# Patient Record
Sex: FEMALE | Race: BLACK OR AFRICAN AMERICAN | NOT HISPANIC OR LATINO
[De-identification: names, ages, dates, MRNs, and addresses within clinical notes are randomized per-mention and may not be internally consistent; named-entity substitution may affect disease eponyms.]

---

## 2018-12-10 ENCOUNTER — APPOINTMENT (OUTPATIENT)
Dept: MRI IMAGING | Facility: HOSPITAL | Age: 83
End: 2018-12-10

## 2018-12-10 ENCOUNTER — OUTPATIENT (OUTPATIENT)
Dept: OUTPATIENT SERVICES | Facility: HOSPITAL | Age: 83
LOS: 1 days | End: 2018-12-10
Payer: COMMERCIAL

## 2018-12-10 DIAGNOSIS — I50.32 CHRONIC DIASTOLIC (CONGESTIVE) HEART FAILURE: ICD-10-CM

## 2018-12-10 DIAGNOSIS — I51.7 CARDIOMEGALY: ICD-10-CM

## 2018-12-10 PROCEDURE — 75561 CARDIAC MRI FOR MORPH W/DYE: CPT

## 2018-12-10 PROCEDURE — A9585: CPT

## 2018-12-10 PROCEDURE — 75561 CARDIAC MRI FOR MORPH W/DYE: CPT | Mod: 26

## 2018-12-14 ENCOUNTER — OUTPATIENT (OUTPATIENT)
Dept: OUTPATIENT SERVICES | Facility: HOSPITAL | Age: 83
LOS: 1 days | End: 2018-12-14

## 2018-12-14 DIAGNOSIS — I50.32 CHRONIC DIASTOLIC (CONGESTIVE) HEART FAILURE: ICD-10-CM

## 2018-12-14 PROCEDURE — 75561 CARDIAC MRI FOR MORPH W/DYE: CPT

## 2018-12-14 PROCEDURE — 75561 CARDIAC MRI FOR MORPH W/DYE: CPT | Mod: 26

## 2019-01-01 ENCOUNTER — OUTPATIENT (OUTPATIENT)
Dept: OUTPATIENT SERVICES | Facility: HOSPITAL | Age: 84
LOS: 1 days | End: 2019-01-01
Payer: MEDICARE

## 2019-01-01 PROCEDURE — G9001: CPT

## 2019-01-02 RX ORDER — AZTREONAM 2 G
1 VIAL (EA) INJECTION
Qty: 0 | Refills: 0 | COMMUNITY
Start: 2019-01-02

## 2019-01-05 ENCOUNTER — INPATIENT (INPATIENT)
Facility: HOSPITAL | Age: 84
LOS: 8 days | Discharge: HOSPICE | End: 2019-01-14
Attending: HOSPITALIST | Admitting: HOSPITALIST
Payer: MEDICARE

## 2019-01-05 VITALS
DIASTOLIC BLOOD PRESSURE: 76 MMHG | SYSTOLIC BLOOD PRESSURE: 118 MMHG | OXYGEN SATURATION: 100 % | TEMPERATURE: 98 F | HEART RATE: 103 BPM | RESPIRATION RATE: 15 BRPM

## 2019-01-05 DIAGNOSIS — Z29.9 ENCOUNTER FOR PROPHYLACTIC MEASURES, UNSPECIFIED: ICD-10-CM

## 2019-01-05 DIAGNOSIS — N17.9 ACUTE KIDNEY FAILURE, UNSPECIFIED: ICD-10-CM

## 2019-01-05 DIAGNOSIS — I63.9 CEREBRAL INFARCTION, UNSPECIFIED: ICD-10-CM

## 2019-01-05 DIAGNOSIS — R41.82 ALTERED MENTAL STATUS, UNSPECIFIED: ICD-10-CM

## 2019-01-05 DIAGNOSIS — I50.9 HEART FAILURE, UNSPECIFIED: ICD-10-CM

## 2019-01-05 DIAGNOSIS — G93.40 ENCEPHALOPATHY, UNSPECIFIED: ICD-10-CM

## 2019-01-05 DIAGNOSIS — R74.8 ABNORMAL LEVELS OF OTHER SERUM ENZYMES: ICD-10-CM

## 2019-01-05 LAB
ALBUMIN SERPL ELPH-MCNC: 3.3 G/DL — SIGNIFICANT CHANGE UP (ref 3.3–5)
ALBUMIN SERPL ELPH-MCNC: 3.3 G/DL — SIGNIFICANT CHANGE UP (ref 3.3–5)
ALBUMIN SERPL ELPH-MCNC: 3.6 G/DL — SIGNIFICANT CHANGE UP (ref 3.3–5)
ALP SERPL-CCNC: 476 U/L — HIGH (ref 40–120)
ALP SERPL-CCNC: 486 U/L — HIGH (ref 40–120)
ALP SERPL-CCNC: 489 U/L — HIGH (ref 40–120)
ALT FLD-CCNC: 157 U/L — HIGH (ref 4–33)
ALT FLD-CCNC: 161 U/L — HIGH (ref 4–33)
ALT FLD-CCNC: 178 U/L — HIGH (ref 4–33)
APPEARANCE UR: CLEAR — SIGNIFICANT CHANGE UP
APPEARANCE UR: CLEAR — SIGNIFICANT CHANGE UP
AST SERPL-CCNC: 131 U/L — HIGH (ref 4–32)
AST SERPL-CCNC: 147 U/L — HIGH (ref 4–32)
AST SERPL-CCNC: 201 U/L — HIGH (ref 4–32)
B PERT DNA SPEC QL NAA+PROBE: NOT DETECTED — SIGNIFICANT CHANGE UP
BACTERIA # UR AUTO: NEGATIVE — SIGNIFICANT CHANGE UP
BACTERIA # UR AUTO: NEGATIVE — SIGNIFICANT CHANGE UP
BASE EXCESS BLDA CALC-SCNC: -3.4 MMOL/L — SIGNIFICANT CHANGE UP
BASE EXCESS BLDV CALC-SCNC: 3.9 MMOL/L — SIGNIFICANT CHANGE UP
BASOPHILS # BLD AUTO: 0.08 K/UL — SIGNIFICANT CHANGE UP (ref 0–0.2)
BASOPHILS # BLD AUTO: 0.1 K/UL — SIGNIFICANT CHANGE UP (ref 0–0.2)
BASOPHILS NFR BLD AUTO: 1.2 % — SIGNIFICANT CHANGE UP (ref 0–2)
BASOPHILS NFR BLD AUTO: 1.3 % — SIGNIFICANT CHANGE UP (ref 0–2)
BILIRUB SERPL-MCNC: 0.4 MG/DL — SIGNIFICANT CHANGE UP (ref 0.2–1.2)
BILIRUB SERPL-MCNC: 0.5 MG/DL — SIGNIFICANT CHANGE UP (ref 0.2–1.2)
BILIRUB SERPL-MCNC: 0.7 MG/DL — SIGNIFICANT CHANGE UP (ref 0.2–1.2)
BILIRUB UR-MCNC: NEGATIVE — SIGNIFICANT CHANGE UP
BILIRUB UR-MCNC: NEGATIVE — SIGNIFICANT CHANGE UP
BLOOD GAS VENOUS - CREATININE: 1.5 MG/DL — HIGH (ref 0.5–1.3)
BLOOD UR QL VISUAL: SIGNIFICANT CHANGE UP
BLOOD UR QL VISUAL: SIGNIFICANT CHANGE UP
BUN SERPL-MCNC: 15 MG/DL — SIGNIFICANT CHANGE UP (ref 7–23)
BUN SERPL-MCNC: 15 MG/DL — SIGNIFICANT CHANGE UP (ref 7–23)
BUN SERPL-MCNC: 16 MG/DL — SIGNIFICANT CHANGE UP (ref 7–23)
C PNEUM DNA SPEC QL NAA+PROBE: NOT DETECTED — SIGNIFICANT CHANGE UP
CA-I BLDA-SCNC: 1.16 MMOL/L — SIGNIFICANT CHANGE UP (ref 1.15–1.29)
CALCIUM SERPL-MCNC: 9 MG/DL — SIGNIFICANT CHANGE UP (ref 8.4–10.5)
CALCIUM SERPL-MCNC: 9.2 MG/DL — SIGNIFICANT CHANGE UP (ref 8.4–10.5)
CALCIUM SERPL-MCNC: 9.3 MG/DL — SIGNIFICANT CHANGE UP (ref 8.4–10.5)
CHLORIDE BLDV-SCNC: 103 MMOL/L — SIGNIFICANT CHANGE UP (ref 96–108)
CHLORIDE SERPL-SCNC: 100 MMOL/L — SIGNIFICANT CHANGE UP (ref 98–107)
CHLORIDE SERPL-SCNC: 96 MMOL/L — LOW (ref 98–107)
CHLORIDE SERPL-SCNC: 98 MMOL/L — SIGNIFICANT CHANGE UP (ref 98–107)
CO2 SERPL-SCNC: 15 MMOL/L — LOW (ref 22–31)
CO2 SERPL-SCNC: 25 MMOL/L — SIGNIFICANT CHANGE UP (ref 22–31)
CO2 SERPL-SCNC: 26 MMOL/L — SIGNIFICANT CHANGE UP (ref 22–31)
COLOR SPEC: COLORLESS — SIGNIFICANT CHANGE UP
COLOR SPEC: SIGNIFICANT CHANGE UP
CREAT SERPL-MCNC: 1.46 MG/DL — HIGH (ref 0.5–1.3)
CREAT SERPL-MCNC: 1.46 MG/DL — HIGH (ref 0.5–1.3)
CREAT SERPL-MCNC: 1.53 MG/DL — HIGH (ref 0.5–1.3)
EOSINOPHIL # BLD AUTO: 0.03 K/UL — SIGNIFICANT CHANGE UP (ref 0–0.5)
EOSINOPHIL # BLD AUTO: 0.25 K/UL — SIGNIFICANT CHANGE UP (ref 0–0.5)
EOSINOPHIL NFR BLD AUTO: 0.4 % — SIGNIFICANT CHANGE UP (ref 0–6)
EOSINOPHIL NFR BLD AUTO: 3.8 % — SIGNIFICANT CHANGE UP (ref 0–6)
FLUAV H1 2009 PAND RNA SPEC QL NAA+PROBE: NOT DETECTED — SIGNIFICANT CHANGE UP
FLUAV H1 RNA SPEC QL NAA+PROBE: NOT DETECTED — SIGNIFICANT CHANGE UP
FLUAV H3 RNA SPEC QL NAA+PROBE: NOT DETECTED — SIGNIFICANT CHANGE UP
FLUAV SUBTYP SPEC NAA+PROBE: NOT DETECTED — SIGNIFICANT CHANGE UP
FLUBV RNA SPEC QL NAA+PROBE: NOT DETECTED — SIGNIFICANT CHANGE UP
GAS PNL BLDV: 133 MMOL/L — LOW (ref 136–146)
GLUCOSE BLDA-MCNC: 154 MG/DL — HIGH (ref 70–99)
GLUCOSE BLDV-MCNC: 97 — SIGNIFICANT CHANGE UP (ref 70–99)
GLUCOSE SERPL-MCNC: 129 MG/DL — HIGH (ref 70–99)
GLUCOSE SERPL-MCNC: 153 MG/DL — HIGH (ref 70–99)
GLUCOSE SERPL-MCNC: 96 MG/DL — SIGNIFICANT CHANGE UP (ref 70–99)
GLUCOSE UR-MCNC: NEGATIVE — SIGNIFICANT CHANGE UP
GLUCOSE UR-MCNC: NEGATIVE — SIGNIFICANT CHANGE UP
HADV DNA SPEC QL NAA+PROBE: NOT DETECTED — SIGNIFICANT CHANGE UP
HCO3 BLDA-SCNC: 21 MMOL/L — LOW (ref 22–26)
HCO3 BLDV-SCNC: 27 MMOL/L — SIGNIFICANT CHANGE UP (ref 20–27)
HCOV PNL SPEC NAA+PROBE: SIGNIFICANT CHANGE UP
HCT VFR BLD CALC: 31 % — LOW (ref 34.5–45)
HCT VFR BLD CALC: 36.9 % — SIGNIFICANT CHANGE UP (ref 34.5–45)
HCT VFR BLDA CALC: 30.1 % — LOW (ref 34.5–46.5)
HCT VFR BLDV CALC: 32 % — LOW (ref 34.5–45)
HGB BLD-MCNC: 10 G/DL — LOW (ref 11.5–15.5)
HGB BLD-MCNC: 11.3 G/DL — LOW (ref 11.5–15.5)
HGB BLDA-MCNC: 9.7 G/DL — LOW (ref 11.5–15.5)
HGB BLDV-MCNC: 10.4 G/DL — LOW (ref 11.5–15.5)
HMPV RNA SPEC QL NAA+PROBE: NOT DETECTED — SIGNIFICANT CHANGE UP
HPIV1 RNA SPEC QL NAA+PROBE: NOT DETECTED — SIGNIFICANT CHANGE UP
HPIV2 RNA SPEC QL NAA+PROBE: NOT DETECTED — SIGNIFICANT CHANGE UP
HPIV3 RNA SPEC QL NAA+PROBE: NOT DETECTED — SIGNIFICANT CHANGE UP
HPIV4 RNA SPEC QL NAA+PROBE: NOT DETECTED — SIGNIFICANT CHANGE UP
HYALINE CASTS # UR AUTO: SIGNIFICANT CHANGE UP
IMM GRANULOCYTES NFR BLD AUTO: 0.6 % — SIGNIFICANT CHANGE UP (ref 0–1.5)
IMM GRANULOCYTES NFR BLD AUTO: 0.8 % — SIGNIFICANT CHANGE UP (ref 0–1.5)
KETONES UR-MCNC: NEGATIVE — SIGNIFICANT CHANGE UP
KETONES UR-MCNC: NEGATIVE — SIGNIFICANT CHANGE UP
LACTATE BLDA-SCNC: 4.2 MMOL/L — CRITICAL HIGH (ref 0.5–2)
LACTATE BLDV-MCNC: 1.4 MMOL/L — SIGNIFICANT CHANGE UP (ref 0.5–2)
LEUKOCYTE ESTERASE UR-ACNC: SIGNIFICANT CHANGE UP
LEUKOCYTE ESTERASE UR-ACNC: SIGNIFICANT CHANGE UP
LYMPHOCYTES # BLD AUTO: 0.85 K/UL — LOW (ref 1–3.3)
LYMPHOCYTES # BLD AUTO: 1.34 K/UL — SIGNIFICANT CHANGE UP (ref 1–3.3)
LYMPHOCYTES # BLD AUTO: 11.1 % — LOW (ref 13–44)
LYMPHOCYTES # BLD AUTO: 20.4 % — SIGNIFICANT CHANGE UP (ref 13–44)
MAGNESIUM SERPL-MCNC: 2.2 MG/DL — SIGNIFICANT CHANGE UP (ref 1.6–2.6)
MCHC RBC-ENTMCNC: 25.6 PG — LOW (ref 27–34)
MCHC RBC-ENTMCNC: 26 PG — LOW (ref 27–34)
MCHC RBC-ENTMCNC: 30.6 % — LOW (ref 32–36)
MCHC RBC-ENTMCNC: 32.3 % — SIGNIFICANT CHANGE UP (ref 32–36)
MCV RBC AUTO: 79.5 FL — LOW (ref 80–100)
MCV RBC AUTO: 84.8 FL — SIGNIFICANT CHANGE UP (ref 80–100)
MONOCYTES # BLD AUTO: 0.52 K/UL — SIGNIFICANT CHANGE UP (ref 0–0.9)
MONOCYTES # BLD AUTO: 0.53 K/UL — SIGNIFICANT CHANGE UP (ref 0–0.9)
MONOCYTES NFR BLD AUTO: 6.8 % — SIGNIFICANT CHANGE UP (ref 2–14)
MONOCYTES NFR BLD AUTO: 8.1 % — SIGNIFICANT CHANGE UP (ref 2–14)
NEUTROPHILS # BLD AUTO: 4.32 K/UL — SIGNIFICANT CHANGE UP (ref 1.8–7.4)
NEUTROPHILS # BLD AUTO: 6.08 K/UL — SIGNIFICANT CHANGE UP (ref 1.8–7.4)
NEUTROPHILS NFR BLD AUTO: 65.9 % — SIGNIFICANT CHANGE UP (ref 43–77)
NEUTROPHILS NFR BLD AUTO: 79.6 % — HIGH (ref 43–77)
NITRITE UR-MCNC: NEGATIVE — SIGNIFICANT CHANGE UP
NITRITE UR-MCNC: NEGATIVE — SIGNIFICANT CHANGE UP
NRBC # FLD: 0 K/UL — LOW (ref 25–125)
NRBC # FLD: 0 K/UL — LOW (ref 25–125)
PCO2 BLDA: 44 MMHG — SIGNIFICANT CHANGE UP (ref 32–48)
PCO2 BLDV: 46 MMHG — SIGNIFICANT CHANGE UP (ref 41–51)
PH BLDA: 7.32 PH — LOW (ref 7.35–7.45)
PH BLDV: 7.41 PH — SIGNIFICANT CHANGE UP (ref 7.32–7.43)
PH UR: 6 — SIGNIFICANT CHANGE UP (ref 5–8)
PH UR: 6.5 — SIGNIFICANT CHANGE UP (ref 5–8)
PHOSPHATE SERPL-MCNC: 5.1 MG/DL — HIGH (ref 2.5–4.5)
PLATELET # BLD AUTO: 367 K/UL — SIGNIFICANT CHANGE UP (ref 150–400)
PLATELET # BLD AUTO: 376 K/UL — SIGNIFICANT CHANGE UP (ref 150–400)
PMV BLD: 10.9 FL — SIGNIFICANT CHANGE UP (ref 7–13)
PMV BLD: 11 FL — SIGNIFICANT CHANGE UP (ref 7–13)
PO2 BLDA: 137 MMHG — HIGH (ref 83–108)
PO2 BLDV: 46 MMHG — HIGH (ref 35–40)
POTASSIUM BLDA-SCNC: 4.7 MMOL/L — HIGH (ref 3.4–4.5)
POTASSIUM BLDV-SCNC: SIGNIFICANT CHANGE UP MMOL/L (ref 3.4–4.5)
POTASSIUM SERPL-MCNC: 5.2 MMOL/L — SIGNIFICANT CHANGE UP (ref 3.5–5.3)
POTASSIUM SERPL-MCNC: 5.5 MMOL/L — HIGH (ref 3.5–5.3)
POTASSIUM SERPL-MCNC: 6 MMOL/L — HIGH (ref 3.5–5.3)
POTASSIUM SERPL-SCNC: 5.2 MMOL/L — SIGNIFICANT CHANGE UP (ref 3.5–5.3)
POTASSIUM SERPL-SCNC: 5.5 MMOL/L — HIGH (ref 3.5–5.3)
POTASSIUM SERPL-SCNC: 6 MMOL/L — HIGH (ref 3.5–5.3)
PROT SERPL-MCNC: 7.1 G/DL — SIGNIFICANT CHANGE UP (ref 6–8.3)
PROT SERPL-MCNC: 7.7 G/DL — SIGNIFICANT CHANGE UP (ref 6–8.3)
PROT SERPL-MCNC: 7.8 G/DL — SIGNIFICANT CHANGE UP (ref 6–8.3)
PROT UR-MCNC: 30 — SIGNIFICANT CHANGE UP
PROT UR-MCNC: NEGATIVE — SIGNIFICANT CHANGE UP
RBC # BLD: 3.9 M/UL — SIGNIFICANT CHANGE UP (ref 3.8–5.2)
RBC # BLD: 4.35 M/UL — SIGNIFICANT CHANGE UP (ref 3.8–5.2)
RBC # FLD: 15.3 % — HIGH (ref 10.3–14.5)
RBC # FLD: 15.6 % — HIGH (ref 10.3–14.5)
RBC CASTS # UR COMP ASSIST: HIGH (ref 0–?)
RBC CASTS # UR COMP ASSIST: HIGH (ref 0–?)
RSV RNA SPEC QL NAA+PROBE: NOT DETECTED — SIGNIFICANT CHANGE UP
RV+EV RNA SPEC QL NAA+PROBE: NOT DETECTED — SIGNIFICANT CHANGE UP
SAO2 % BLDA: 98.6 % — SIGNIFICANT CHANGE UP (ref 95–99)
SAO2 % BLDV: 78.7 % — SIGNIFICANT CHANGE UP (ref 60–85)
SODIUM BLDA-SCNC: 132 MMOL/L — LOW (ref 136–146)
SODIUM SERPL-SCNC: 133 MMOL/L — LOW (ref 135–145)
SODIUM SERPL-SCNC: 135 MMOL/L — SIGNIFICANT CHANGE UP (ref 135–145)
SODIUM SERPL-SCNC: 138 MMOL/L — SIGNIFICANT CHANGE UP (ref 135–145)
SP GR SPEC: 1.02 — SIGNIFICANT CHANGE UP (ref 1–1.04)
SP GR SPEC: 1.02 — SIGNIFICANT CHANGE UP (ref 1–1.04)
SQUAMOUS # UR AUTO: SIGNIFICANT CHANGE UP
SQUAMOUS # UR AUTO: SIGNIFICANT CHANGE UP
TROPONIN T, HIGH SENSITIVITY: 58 NG/L — CRITICAL HIGH (ref ?–14)
TROPONIN T, HIGH SENSITIVITY: 81 NG/L — CRITICAL HIGH (ref ?–14)
UROBILINOGEN FLD QL: NORMAL — SIGNIFICANT CHANGE UP
UROBILINOGEN FLD QL: NORMAL — SIGNIFICANT CHANGE UP
WBC # BLD: 6.56 K/UL — SIGNIFICANT CHANGE UP (ref 3.8–10.5)
WBC # BLD: 7.64 K/UL — SIGNIFICANT CHANGE UP (ref 3.8–10.5)
WBC # FLD AUTO: 6.56 K/UL — SIGNIFICANT CHANGE UP (ref 3.8–10.5)
WBC # FLD AUTO: 7.64 K/UL — SIGNIFICANT CHANGE UP (ref 3.8–10.5)
WBC UR QL: HIGH (ref 0–?)
WBC UR QL: SIGNIFICANT CHANGE UP (ref 0–?)

## 2019-01-05 PROCEDURE — 70498 CT ANGIOGRAPHY NECK: CPT | Mod: 26

## 2019-01-05 PROCEDURE — 99223 1ST HOSP IP/OBS HIGH 75: CPT | Mod: GC

## 2019-01-05 PROCEDURE — 76705 ECHO EXAM OF ABDOMEN: CPT | Mod: 26

## 2019-01-05 PROCEDURE — 71045 X-RAY EXAM CHEST 1 VIEW: CPT | Mod: 26

## 2019-01-05 PROCEDURE — 70496 CT ANGIOGRAPHY HEAD: CPT | Mod: 26

## 2019-01-05 PROCEDURE — 93010 ELECTROCARDIOGRAM REPORT: CPT

## 2019-01-05 PROCEDURE — 99497 ADVNCD CARE PLAN 30 MIN: CPT | Mod: 25

## 2019-01-05 RX ORDER — BRIMONIDINE TARTRATE 2 MG/MG
1 SOLUTION/ DROPS OPHTHALMIC DAILY
Qty: 0 | Refills: 0 | Status: DISCONTINUED | OUTPATIENT
Start: 2019-01-05 | End: 2019-01-14

## 2019-01-05 RX ORDER — ACETAMINOPHEN 500 MG
650 TABLET ORAL EVERY 6 HOURS
Qty: 0 | Refills: 0 | Status: DISCONTINUED | OUTPATIENT
Start: 2019-01-05 | End: 2019-01-05

## 2019-01-05 RX ORDER — FUROSEMIDE 40 MG
40 TABLET ORAL ONCE
Qty: 0 | Refills: 0 | Status: COMPLETED | OUTPATIENT
Start: 2019-01-05 | End: 2019-01-05

## 2019-01-05 RX ORDER — MECLIZINE HCL 12.5 MG
25 TABLET ORAL ONCE
Qty: 0 | Refills: 0 | Status: DISCONTINUED | OUTPATIENT
Start: 2019-01-05 | End: 2019-01-05

## 2019-01-05 RX ORDER — CEFTRIAXONE 500 MG/1
1 INJECTION, POWDER, FOR SOLUTION INTRAMUSCULAR; INTRAVENOUS ONCE
Qty: 0 | Refills: 0 | Status: COMPLETED | OUTPATIENT
Start: 2019-01-05 | End: 2019-01-05

## 2019-01-05 RX ORDER — SODIUM CHLORIDE 9 MG/ML
1000 INJECTION INTRAMUSCULAR; INTRAVENOUS; SUBCUTANEOUS ONCE
Qty: 0 | Refills: 0 | Status: COMPLETED | OUTPATIENT
Start: 2019-01-05 | End: 2019-01-05

## 2019-01-05 RX ORDER — CEFEPIME 1 G/1
1000 INJECTION, POWDER, FOR SOLUTION INTRAMUSCULAR; INTRAVENOUS EVERY 24 HOURS
Qty: 0 | Refills: 0 | Status: DISCONTINUED | OUTPATIENT
Start: 2019-01-06 | End: 2019-01-06

## 2019-01-05 RX ORDER — VANCOMYCIN HCL 1 G
1000 VIAL (EA) INTRAVENOUS EVERY 12 HOURS
Qty: 0 | Refills: 0 | Status: DISCONTINUED | OUTPATIENT
Start: 2019-01-06 | End: 2019-01-06

## 2019-01-05 RX ORDER — AZITHROMYCIN 500 MG/1
500 TABLET, FILM COATED ORAL ONCE
Qty: 0 | Refills: 0 | Status: COMPLETED | OUTPATIENT
Start: 2019-01-05 | End: 2019-01-05

## 2019-01-05 RX ORDER — ACETAMINOPHEN 500 MG
650 TABLET ORAL EVERY 6 HOURS
Qty: 0 | Refills: 0 | Status: DISCONTINUED | OUTPATIENT
Start: 2019-01-05 | End: 2019-01-06

## 2019-01-05 RX ORDER — ACETAMINOPHEN 500 MG
1000 TABLET ORAL ONCE
Qty: 0 | Refills: 0 | Status: COMPLETED | OUTPATIENT
Start: 2019-01-05 | End: 2019-01-05

## 2019-01-05 RX ORDER — DORZOLAMIDE HYDROCHLORIDE 20 MG/ML
1 SOLUTION/ DROPS OPHTHALMIC THREE TIMES A DAY
Qty: 0 | Refills: 0 | Status: DISCONTINUED | OUTPATIENT
Start: 2019-01-05 | End: 2019-01-14

## 2019-01-05 RX ORDER — TIMOLOL 0.5 %
1 DROPS OPHTHALMIC (EYE) DAILY
Qty: 0 | Refills: 0 | Status: DISCONTINUED | OUTPATIENT
Start: 2019-01-05 | End: 2019-01-14

## 2019-01-05 RX ADMIN — CEFTRIAXONE 1 GRAM(S): 500 INJECTION, POWDER, FOR SOLUTION INTRAMUSCULAR; INTRAVENOUS at 08:18

## 2019-01-05 RX ADMIN — AZITHROMYCIN 250 MILLIGRAM(S): 500 TABLET, FILM COATED ORAL at 09:50

## 2019-01-05 RX ADMIN — SODIUM CHLORIDE 500 MILLILITER(S): 9 INJECTION INTRAMUSCULAR; INTRAVENOUS; SUBCUTANEOUS at 09:47

## 2019-01-05 RX ADMIN — Medication 650 MILLIGRAM(S): at 15:28

## 2019-01-05 RX ADMIN — Medication 1000 MILLIGRAM(S): at 08:18

## 2019-01-05 RX ADMIN — CEFTRIAXONE 100 GRAM(S): 500 INJECTION, POWDER, FOR SOLUTION INTRAMUSCULAR; INTRAVENOUS at 06:38

## 2019-01-05 RX ADMIN — Medication 40 MILLIGRAM(S): at 16:30

## 2019-01-05 RX ADMIN — Medication 400 MILLIGRAM(S): at 06:43

## 2019-01-05 NOTE — H&P ADULT - NSHPPHYSICALEXAM_GEN_ALL_CORE
PHYSICAL EXAM:  GENERAL: Mild ranging to moderate distress, thin, lying on her right side  HEAD:  Atraumatic, Normocephalic  EYES: EOMI, conjunctiva and sclera clear  NECK: Supple, No obvious JVD  CHEST/LUNG: Soft crackles in dependent portion of R lung posteriorly ; No wheeze  HEART: Fast rate and rhythm; No murmurs, rubs, or gallops  ABDOMEN: Soft, Nontender, Nondistended; Bowel sounds present  EXTREMITIES:  2+ Peripheral Pulses, No clubbing, cyanosis, or LE edema  PSYCH: AAOx0-1, unable to state own name, says I can't say, but does respond to her name  NEUROLOGY: non-focal, gross UE strength R< L  SKIN: No rashes or lesions

## 2019-01-05 NOTE — ED ADULT NURSE NOTE - CHIEF COMPLAINT QUOTE
from [facility] weak lethargic  as per daughters has altered mental status  last normal unknown   recent UTI  CVA with right weakness  2 weeks ago   hx CHF  breast Ca

## 2019-01-05 NOTE — ED PROVIDER NOTE - CARE PLAN
Principal Discharge DX:	Altered mental status  Goal:	Most likely metabolic vs ischemic stroke  Assessment and plan of treatment:	Most likely metabolic vs ischemic stroke, CTH showed subacute stroke and UA/CXR/ALP/LFT elevated possible sources of AMS

## 2019-01-05 NOTE — CONSULT NOTE ADULT - SUBJECTIVE AND OBJECTIVE BOX
HPI:  History obtained from family and chart, patient is uttering few words only, cannot be interviewed  Patient is an 85 year old RH AA female with PMHx of stroke 2 weeks PTA with right sided deficits (unable to further specify, but is now ambulating with walker but was not using one prior to stroke), Breast Ca, CHF who was at [facility] for PT/rehab. Since the stroke the patient has been unable to do her ADLs, and has had worsened mentation and memory. The patient has been having worsening confusion and increased fatigue ability. She has no known recent infections, but had ?abdominal pain earlier this week.  Outside hospital records are not available but family states that they will bring them in.  NIHSS (at least) 15, preMRS 3    PAST MEDICAL & SURGICAL HISTORY:  CHF (congestive heart failure)  Breast cancer  CVA (cerebral vascular accident)  No significant past surgical history    Allergies  No Known Allergies    Review of Systems:  CONSTITUTIONAL:  No weight loss, fever, chills, weakness or fatigue.  HEENT:  Eyes:  No visual loss, blurred vision, double vision or yellow sclerae. Ears, Nose, Throat:  No hearing loss, sneezing, congestion, runny nose or sore throat.  CARDIOVASCULAR:  No chest pain, chest pressure or chest discomfort. No palpitations or edema.  GASTROINTESTINAL:  No anorexia, nausea, vomiting or diarrhea. No abdominal pain or blood.  NEUROLOGICAL: See HPI  MUSCULOSKELETAL:  No muscle, back pain, joint pain or stiffness.  PSYCHIATRIC:  No history of depression or anxiety.    Vital Signs Last 24 Hrs  T(C): 36.6 (05 Jan 2019 03:46), Max: 36.6 (05 Jan 2019 01:14)  T(F): 97.8 (05 Jan 2019 03:46), Max: 97.8 (05 Jan 2019 01:14)  HR: 103 (05 Jan 2019 09:54) (103 - 103)  BP: 129/70 (05 Jan 2019 09:54) (104/53 - 129/70)  BP(mean): --  RR: 16 (05 Jan 2019 09:54) (15 - 17)  SpO2: 98% (05 Jan 2019 09:54) (98% - 100%)    General Exam:   General appearance: No acute distress                 Neurological Exam:  Mental Status: AOx0  severely aphasic     Cranial Nerves: CN I - not tested.  s/p cataract surgery in R eye, lt eye reactive  No facial asymmetry.     Motor:   Tone: normal.                  Strength:     not participating/cooperating with exam. able to squeeze fingers ( 4/5 bilaterally), able to raise LLE (approx 4/5 HF), cannot lift RLE    Pronator drift: cannot assess                 Dysmetria: cannot assess     Tremor: No resting, postural or action tremor.  No myoclonus.    Sensation: intact to pain (pinching)    Toes flexor bilaterally  Gait: deferred    Other:  Radiology  CT/CTA H/N: ill defined L parietal subacute infarct, hypoattenuation in gyral margin in L parietal area. R ICA occlusion.

## 2019-01-05 NOTE — ED ADULT NURSE NOTE - OBJECTIVE STATEMENT
Pt arrives from [facility] to room 19 with daughters at bedside.  Pt family reports pt lethargic and weak with AMS.  Unknown last normal.  Pt family reports pt had recent cva with right sided weakness and difficulty answering questions.  Prior to stroke ot was A&Ox3.  Pt arrives with indwelling Rader catheter in place.  Urine drawn and sent.  20g iv placed, labs drawn and sent. Pt awaiting CT and xr.

## 2019-01-05 NOTE — ED PROVIDER NOTE - ATTENDING CONTRIBUTION TO CARE
MD Grimes:  I performed a face to face bedside interview with patient regarding history of present illness, review of symptoms and past medical history. I completed an independent physical exam(documented below).  I have discussed patient's plan of care with resident.   I agree with note as stated above, having amended the EMR as needed to reflect my findings. I have discussed the assessment and plan of care.  This includes during the time I functioned as the attending physician for this patient.  PE:  Gen: Alert, confused  Head: NC, AT,  EOMI, normal lids/conjunctiva  ENT:  normal hearing, patent oropharynx without erythema/exudate  Neck: +supple, no tenderness/meningismus/JVD, +Trachea midline  Chest: no chest wall tenderness, equal chest rise  Pulm: Bilateral BS, normal resp effort, no wheeze/stridor/retractions  CV: RRR, no M/R/G, +dist pulses  Abd: +BS, soft, NT/ND  Rectal: deferred  Mskel: no edema/erythema/cyanosis  Neuro: AA, not oriented, follows basic commands  MDM:  86yo F w/ pmh of chf, breast ca, s/p cva 2wks ago sent from [facility] for AMS since cva but not acute worsening of confusion and fatigue over last 2 days. Labs and imaging to r/o delirium from infectious etiology vs new cva, and likely admission.

## 2019-01-05 NOTE — H&P ADULT - ATTENDING COMMENTS
Patient was seen and examined personally by me. I have discussed the plan and reviewed the Resident's note and agree with the above physical exam findings including assessment and plan except as indicated below. Labs and imagining reviewed.     #metabolic encephalopathy  #CAUTI  #RAMANA  #CVA  #Acute decompensated CHF  #elevated LFT    Patient is 85F CHF, breast ca, recent CVA presented with AMS. complicated UTI in setting of indwelling murray catheter for retention. replace murray, send culture after murray replacement, f/u cultures. empirical abx treatment for CAUTI. RAMANA, likely in setting of UTI with diuresis. s/p 1L bolus in ED. acute decompensated CHF. RRT called due to patient had acute respiratory distress when presented to floor. Upon evaluation, patient was hypoxic to mid 80s on RA. oxygenation responded to 4L NC. labs drawn from RRT showed acute lactic acidosis, likely secondary to hypoxia from pulmonary edema. also elevated trop in setting of  clinically no chest pain, will get repeat EKG, serial cardiac enzyme, transfer to tele for monitor. s/p lasix, monitor respiratory status. c/w lasix PRN for fluid overload, monitor cr closely with diuresis.     GOC: spoke to daughter at bedsides during RRT regarding code status. Family was made aware of potential for decompensation overnight and possible need for emergent cardiac resuscitation. Daughter understands the condition, but would like to keep full code for now until further discussion with family. GOC discussion to be continued by primary team.

## 2019-01-05 NOTE — ED ADULT TRIAGE NOTE - CHIEF COMPLAINT QUOTE
from [facility] weak lethargic  as per daughters has altered mental status  last normal unknown   recent UTI  CVA with right weakness  2 weeks ago   hx CHF  breast Ca

## 2019-01-05 NOTE — H&P ADULT - PROBLEM SELECTOR PLAN 5
with recent tylenol use for headaches and on statin  obtain urine tox for acetaminophen  hold statin  may c/w acetaminophen carefully for pain (maintain < 4g per day)

## 2019-01-05 NOTE — ED ADULT NURSE REASSESSMENT NOTE - NS ED NURSE REASSESS COMMENT FT1
Pt c/o back pain following xr.  Attending assessed pt.  Pt medicated as per EMAR.
daughter refused VS
Pt family refuses rectal temperature.  Attending at bedside assessing pt.

## 2019-01-05 NOTE — ED PROVIDER NOTE - PROGRESS NOTE DETAILS
Ua with concern for UTI, will treat w antibx. Signed out to day team, patient pending CXR and CTA. Will require admit for further care, pending ED workup  William Unger MD, PGY2 Emergency Medicine Found on CTH to have chronic subacute occipital infarct but also CT head and neck w/ R internal carotid artery occlussion, neuro consulted, would possibly need vascular surgery consult Found on CTH to have chronic subacute occipital infarct but also CT head and neck w/ R internal carotid artery occlussion, neuro consulted, and RUQ US for elevated LFTs and ALP

## 2019-01-05 NOTE — ED PROVIDER NOTE - PLAN OF CARE
Most likely metabolic vs ischemic stroke Most likely metabolic vs ischemic stroke, CTH showed subacute stroke and UA/CXR/ALP/LFT elevated possible sources of AMS

## 2019-01-05 NOTE — H&P ADULT - NSHPLABSRESULTS_GEN_ALL_CORE
LABS:                        11.3   7.64  )-----------( 367      ( 2019 16:50 )             36.9     Auto Eosinophil # 0.03  / Auto Eosinophil % 0.4   / Auto Neutrophil # 6.08  / Auto Neutrophil % 79.6  / BANDS % x                            10.0   6.56  )-----------( 376      ( 2019 05:00 )             31.0     Auto Eosinophil # 0.25  / Auto Eosinophil % 3.8   / Auto Neutrophil # 4.32  / Auto Neutrophil % 65.9  / BANDS % x            138  |  100  |  15  ----------------------------<  96  5.2   |  25  |  1.46<H>    Ca    9.3      2019 05:00  TPro  7.1  /  Alb  3.3  /  TBili  0.4  /  DBili  x   /  AST  201<H>  /  ALT  178<H>  /  AlkPhos  476<H>            Urinalysis Basic - ( 2019 04:00 )    Color: LIGHT YELLOW / Appearance: CLEAR / S.016 / pH: 6.5  Gluc: NEGATIVE / Ketone: NEGATIVE  / Bili: NEGATIVE / Urobili: NORMAL   Blood: SMALL / Protein: 30 / Nitrite: NEGATIVE   Leuk Esterase: MODERATE / RBC: 11-25 / WBC 26-50   Sq Epi: FEW / Non Sq Epi: x / Bacteria: NEGATIVE            ABG: ( 2019 16:50 ) pH: 7.32  /  pCO2: 44    /  pO2: 137   / HCO3: 21    / Base Excess: -3.4  /  SaO2: 98.6                VBG: ( 05:00 ) - VBG - pH: 7.41  | pCO2: 46    | pO2: 46    | Lactate: 1.4        Microbiology:        Care Discussed with Consultants/Other Providers:    RADIOLOGY & ADDITIONAL TESTS:  (Imaging Personally Reviewed)

## 2019-01-05 NOTE — H&P ADULT - NSHPSOCIALHISTORY_GEN_ALL_CORE
Previously lived alone, now at [place] post stroke. Family denies smoking history, alcohol, and illicit drugs.

## 2019-01-05 NOTE — H&P ADULT - ASSESSMENT
85y Female with PMH of CHF, breast ca ~9 years ago with R breast removal, and CVA on 12/15 presents with encephalopathy, possibly 2/2 UTI with positive UA in a catheter present for > 5 days.

## 2019-01-05 NOTE — H&P ADULT - NSHPREVIEWOFSYSTEMS_GEN_ALL_CORE
Unable to obtain ROS from the patient due to decreased MS. Unable to obtain ROS from the patient due to decreased MS.  Per daughter, patient had complaint of abdominal pain, headache, lethargic.

## 2019-01-05 NOTE — H&P ADULT - PROBLEM SELECTOR PLAN 2
EF55% on outside documentation  mild interstitial edema on cxr  monitor fluid intake  c/w lasix and metoprolol, aspirin, holding statin for elevated transaminases

## 2019-01-05 NOTE — CHART NOTE - NSCHARTNOTEFT_GEN_A_CORE
85y Female with PMH of CHF, breast ca ~9 years ago with R breast removal, and CVA on 12/15 presents with encephalopathy, possibly 2/2 UTI with positive UA in a catheter present for > 5 days. RRT called this afternoon for increased work of breathing. Patient required 4L NC and sating 95% on initial eval. Slight paradoxical breathing pattern. On PE, VSSAF, tachy to 110s. Patient was noted to have bilateral crackles and appears lethargic (arousable to verbal stimuli). Decision made to defer bipap as patient lacking adequate mental status. ABG drawn stat. Lasix 40mg given IV. Plan to follow up ABG and place on bipap as needed. Plan discussed with team.    Alex Munguia PGY3 MAR

## 2019-01-05 NOTE — ED ADULT NURSE NOTE - NSIMPLEMENTINTERV_GEN_ALL_ED
Implemented All Fall with Harm Risk Interventions:  [place] to call system. Call bell, personal items and telephone within reach. Instruct patient to call for assistance. Room bathroom lighting operational. Non-slip footwear when patient is off stretcher. Physically safe environment: no spills, clutter or unnecessary equipment. Stretcher in lowest position, wheels locked, appropriate side rails in place. Provide visual cue, wrist band, yellow gown, etc. Monitor gait and stability. Monitor for mental status changes and reorient to person, place, and time. Review medications for side effects contributing to fall risk. Reinforce activity limits and safety measures with patient and family. Provide visual clues: red socks.

## 2019-01-05 NOTE — ED PROVIDER NOTE - PHYSICAL EXAMINATION
awake, alert  NOT oriented name, , date, location, situation  NOT able to identify daughters   NOT able to identify simple objects such as pen or phone by name    decreased strength right upper extremity

## 2019-01-05 NOTE — H&P ADULT - PROBLEM SELECTOR PLAN 3
recent stroke and evolving lesion seen on head CT with continued MS changes  f/u neuro recs: obtain MRI

## 2019-01-05 NOTE — H&P ADULT - HISTORY OF PRESENT ILLNESS
85y Female with PMH of CHF, breast ca ~9 years ago with R breast removal, and CVA on 12/15 presents with approximately 10 days of intermittent confusion, agitation, and headache. Patient's daughter was at bedside and provided the history. After the patient's stroke, she was sent to [place] for rehab as she was no longer able to go about her ADLs for which she was previously independent. At the rehab center, the patient was frustrated and expressed to her family that she wanted to leave and they weren't doing anything for her. Sometimes the patient would be confused or agitated. About a week and a half ago the episodes of confusion and agitation became more frequent and worse in severity. The family was concerned, and the patient started complaining of abdominal pain at that time. The patient had a Rader catheter placed after her stroke for urinary retention, and the facility attempted to remove and then replaced the Rader for continued retention but without relief of the patient's pain at the time. More recently, the patient had been complaining of intense headache, and received Tylenol without much relief of her pain. These symptoms did not improve and the patient's family insisted on her evaluation at [facility] ED for the headaches and confusion. Her daughter denies that the patient had any fevers, CP/SOB, nausea/vomiting, dysuria or diarrhea, or changes in BM. The patient was on a mechanical soft diet at [place], and had some decreased appetite yesterday. She did have a normal BM yesterday and passed flatus more recently.    She was seen and evaluated and found to have the following:  Vitals: T(C): 36.6 (01-05-19 @ 14:42), Max: 36.6 (01-05-19 @ 01:14)  T(F): 97.8 (01-05-19 @ 14:42), Max: 97.8 (01-05-19 @ 01:14)  HR: 121 (01-05-19 @ 14:42) (103 - 121)  BP: 126/72 (01-05-19 @ 14:42) (104/53 - 129/70)  RR: 17 (01-05-19 @ 14:42) (15 - 17)  SpO2: 100% (01-05-19 @ 14:42) (98% - 100%)    In the ED, she received   acetaminophen  IVPB ..   400 mL/Hr IV Intermittent (01-05-19 @ 06:43)    azithromycin  IVPB   250 mL/Hr IV Intermittent (01-05-19 @ 09:50)    cefTRIAXone   IVPB   100 mL/Hr IV Intermittent (01-05-19 @ 06:38)    sodium chloride 0.9% Bolus   500 mL/Hr IV Bolus (01-05-19 @ 09:47)

## 2019-01-05 NOTE — ED PROVIDER NOTE - OBJECTIVE STATEMENT
85F, hx CVA (2 weeks ago), CHF, breast Ca presents from [facility] due to AMS. Hx from daughters, Per daughters, patient had CVA 2 weeks ago (with residual right sided deficits) and went to [facility] for rehab. Since stroke, patient has been unable to manage ADLs and has had difficulty walking, as well as worsening mental status/memory. Per daughters, they brought patient to ED due to concern for worsening confusion and fatigue recently. No known fevers or chills, nausea or vomiting, headache, chest pain, shortness of breath. Possible abdominal pain earlier this week. Rader is in place due to AMS after CVA. Patient is awake and alert but not oriented - not oriented to name, , location. 85F, hx CVA (2 weeks ago), CHF, breast Ca presents from [facility] due to AMS. Hx from daughters, Per daughters, patient had CVA 2 weeks ago (with residual right sided deficits) and went to [facility] for rehab. Since stroke, patient has been unable to manage ADLs and has had difficulty walking, as well as worsening mental status/memory. Per daughters, they brought patient to ED due to concern for worsening confusion and fatigue recently. No known fevers or chills, nausea or vomiting, headache, chest pain, shortness of breath. Possible abdominal pain earlier this week. Rader is in place due to AMS after CVA. Patient is awake and alert but not oriented - not oriented to name, , location. Patient currently denies all symptoms.

## 2019-01-05 NOTE — ED PROVIDER NOTE - MEDICAL DECISION MAKING DETAILS
85F, hx CVA (2 weeks ago), CHF, breast Ca presents from [facility] due to AMS. Concern for worsening mental status from baseline over last 2 weeks. Exam as above. Will eval for infectious and metabolic etiologies of AMS, including labs and UA. Currently stable, no acute distress. Will continue to follow up and re-assess. Case discussed with Attending  William Unger MD, PGY2 Emergency Medicine

## 2019-01-05 NOTE — H&P ADULT - PROBLEM SELECTOR PLAN 1
differential includes UTI and meningitis, meningitis less likely but fever/wbc may be suppressed in elderly pt using frequent tylenol  in setting of possible CAUTI (+ua with murray present > 5 days) and hx of stroke  -antibiotics for CAUTI: vanc and cefepime  -f/u neuro recs: obtain MRI

## 2019-01-05 NOTE — H&P ADULT - FAMILY HISTORY
Grandparent  Still living? Unknown  Family history of hypertension in grandmother, Age at diagnosis: Age Unknown

## 2019-01-06 ENCOUNTER — TRANSCRIPTION ENCOUNTER (OUTPATIENT)
Age: 84
End: 2019-01-06

## 2019-01-06 DIAGNOSIS — I50.23 ACUTE ON CHRONIC SYSTOLIC (CONGESTIVE) HEART FAILURE: ICD-10-CM

## 2019-01-06 LAB
ALBUMIN SERPL ELPH-MCNC: 3.6 G/DL — SIGNIFICANT CHANGE UP (ref 3.3–5)
ALP SERPL-CCNC: 473 U/L — HIGH (ref 40–120)
ALT FLD-CCNC: 148 U/L — HIGH (ref 4–33)
APAP SERPL-MCNC: < 15 UG/ML — LOW (ref 15–25)
APTT BLD: 26.3 SEC — LOW (ref 27.5–36.3)
AST SERPL-CCNC: 114 U/L — HIGH (ref 4–32)
BACTERIA UR CULT: SIGNIFICANT CHANGE UP
BASE EXCESS BLDV CALC-SCNC: 4.9 MMOL/L — SIGNIFICANT CHANGE UP
BILIRUB SERPL-MCNC: 0.6 MG/DL — SIGNIFICANT CHANGE UP (ref 0.2–1.2)
BLOOD GAS VENOUS - CREATININE: 1.54 MG/DL — HIGH (ref 0.5–1.3)
BUN SERPL-MCNC: 14 MG/DL — SIGNIFICANT CHANGE UP (ref 7–23)
BUN SERPL-MCNC: 17 MG/DL — SIGNIFICANT CHANGE UP (ref 7–23)
CALCIUM SERPL-MCNC: 7.9 MG/DL — LOW (ref 8.4–10.5)
CALCIUM SERPL-MCNC: 9.9 MG/DL — SIGNIFICANT CHANGE UP (ref 8.4–10.5)
CHLORIDE BLDV-SCNC: 99 MMOL/L — SIGNIFICANT CHANGE UP (ref 96–108)
CHLORIDE SERPL-SCNC: 104 MMOL/L — SIGNIFICANT CHANGE UP (ref 98–107)
CHLORIDE SERPL-SCNC: 96 MMOL/L — LOW (ref 98–107)
CK MB BLD-MCNC: 3.1 NG/ML — SIGNIFICANT CHANGE UP (ref 1–4.7)
CK MB BLD-MCNC: 3.69 NG/ML — SIGNIFICANT CHANGE UP (ref 1–4.7)
CK MB BLD-MCNC: SIGNIFICANT CHANGE UP (ref 0–2.5)
CK MB BLD-MCNC: SIGNIFICANT CHANGE UP (ref 0–2.5)
CK SERPL-CCNC: 87 U/L — SIGNIFICANT CHANGE UP (ref 25–170)
CK SERPL-CCNC: 94 U/L — SIGNIFICANT CHANGE UP (ref 25–170)
CO2 SERPL-SCNC: 25 MMOL/L — SIGNIFICANT CHANGE UP (ref 22–31)
CO2 SERPL-SCNC: 28 MMOL/L — SIGNIFICANT CHANGE UP (ref 22–31)
CREAT SERPL-MCNC: 0.52 MG/DL — SIGNIFICANT CHANGE UP (ref 0.5–1.3)
CREAT SERPL-MCNC: 1.47 MG/DL — HIGH (ref 0.5–1.3)
ETHANOL BLD-MCNC: < 10 MG/DL — SIGNIFICANT CHANGE UP
GAS PNL BLDV: 134 MMOL/L — LOW (ref 136–146)
GLUCOSE BLDC GLUCOMTR-MCNC: 139 MG/DL — HIGH (ref 70–99)
GLUCOSE BLDC GLUCOMTR-MCNC: 152 MG/DL — HIGH (ref 70–99)
GLUCOSE BLDV-MCNC: 259 — HIGH (ref 70–99)
GLUCOSE SERPL-MCNC: 85 MG/DL — SIGNIFICANT CHANGE UP (ref 70–99)
GLUCOSE SERPL-MCNC: 96 MG/DL — SIGNIFICANT CHANGE UP (ref 70–99)
HCO3 BLDV-SCNC: 27 MMOL/L — SIGNIFICANT CHANGE UP (ref 20–27)
HCT VFR BLD CALC: 35.9 % — SIGNIFICANT CHANGE UP (ref 34.5–45)
HCT VFR BLDV CALC: 31.5 % — LOW (ref 34.5–45)
HGB BLD-MCNC: 11.4 G/DL — LOW (ref 11.5–15.5)
HGB BLDV-MCNC: 10.2 G/DL — LOW (ref 11.5–15.5)
INR BLD: 1.07 — SIGNIFICANT CHANGE UP (ref 0.88–1.17)
LACTATE BLDV-MCNC: 2.9 MMOL/L — HIGH (ref 0.5–2)
MAGNESIUM SERPL-MCNC: 2.1 MG/DL — SIGNIFICANT CHANGE UP (ref 1.6–2.6)
MCHC RBC-ENTMCNC: 26 PG — LOW (ref 27–34)
MCHC RBC-ENTMCNC: 31.8 % — LOW (ref 32–36)
MCV RBC AUTO: 82 FL — SIGNIFICANT CHANGE UP (ref 80–100)
NRBC # FLD: 0 K/UL — LOW (ref 25–125)
PCO2 BLDV: 59 MMHG — HIGH (ref 41–51)
PH BLDV: 7.33 PH — SIGNIFICANT CHANGE UP (ref 7.32–7.43)
PHOSPHATE SERPL-MCNC: 3.4 MG/DL — SIGNIFICANT CHANGE UP (ref 2.5–4.5)
PLATELET # BLD AUTO: 352 K/UL — SIGNIFICANT CHANGE UP (ref 150–400)
PMV BLD: 10.2 FL — SIGNIFICANT CHANGE UP (ref 7–13)
PO2 BLDV: 24 MMHG — LOW (ref 35–40)
POTASSIUM BLDV-SCNC: 5.1 MMOL/L — HIGH (ref 3.4–4.5)
POTASSIUM SERPL-MCNC: 3.9 MMOL/L — SIGNIFICANT CHANGE UP (ref 3.5–5.3)
POTASSIUM SERPL-MCNC: 4.9 MMOL/L — SIGNIFICANT CHANGE UP (ref 3.5–5.3)
POTASSIUM SERPL-SCNC: 3.9 MMOL/L — SIGNIFICANT CHANGE UP (ref 3.5–5.3)
POTASSIUM SERPL-SCNC: 4.9 MMOL/L — SIGNIFICANT CHANGE UP (ref 3.5–5.3)
PROT SERPL-MCNC: 8 G/DL — SIGNIFICANT CHANGE UP (ref 6–8.3)
PROTHROM AB SERPL-ACNC: 11.9 SEC — SIGNIFICANT CHANGE UP (ref 9.8–13.1)
RBC # BLD: 4.38 M/UL — SIGNIFICANT CHANGE UP (ref 3.8–5.2)
RBC # FLD: 15.5 % — HIGH (ref 10.3–14.5)
SALICYLATES SERPL-MCNC: < 5 MG/DL — LOW (ref 15–30)
SAO2 % BLDV: 31.9 % — LOW (ref 60–85)
SODIUM SERPL-SCNC: 135 MMOL/L — SIGNIFICANT CHANGE UP (ref 135–145)
SODIUM SERPL-SCNC: 141 MMOL/L — SIGNIFICANT CHANGE UP (ref 135–145)
SPECIMEN SOURCE: SIGNIFICANT CHANGE UP
WBC # BLD: 7.7 K/UL — SIGNIFICANT CHANGE UP (ref 3.8–10.5)
WBC # FLD AUTO: 7.7 K/UL — SIGNIFICANT CHANGE UP (ref 3.8–10.5)

## 2019-01-06 PROCEDURE — 99233 SBSQ HOSP IP/OBS HIGH 50: CPT | Mod: GC

## 2019-01-06 PROCEDURE — 70551 MRI BRAIN STEM W/O DYE: CPT | Mod: 26

## 2019-01-06 PROCEDURE — 93010 ELECTROCARDIOGRAM REPORT: CPT

## 2019-01-06 PROCEDURE — 99223 1ST HOSP IP/OBS HIGH 75: CPT

## 2019-01-06 RX ORDER — VANCOMYCIN HCL 1 G
750 VIAL (EA) INTRAVENOUS ONCE
Qty: 0 | Refills: 0 | Status: COMPLETED | OUTPATIENT
Start: 2019-01-06 | End: 2019-01-06

## 2019-01-06 RX ORDER — ACETAMINOPHEN 500 MG
325 TABLET ORAL EVERY 6 HOURS
Qty: 0 | Refills: 0 | Status: DISCONTINUED | OUTPATIENT
Start: 2019-01-06 | End: 2019-01-10

## 2019-01-06 RX ORDER — DEXTROSE 50 % IN WATER 50 %
50 SYRINGE (ML) INTRAVENOUS ONCE
Qty: 0 | Refills: 0 | Status: COMPLETED | OUTPATIENT
Start: 2019-01-06 | End: 2019-01-06

## 2019-01-06 RX ORDER — ACETAMINOPHEN 500 MG
325 TABLET ORAL EVERY 6 HOURS
Qty: 0 | Refills: 0 | Status: DISCONTINUED | OUTPATIENT
Start: 2019-01-06 | End: 2019-01-06

## 2019-01-06 RX ORDER — VANCOMYCIN HCL 1 G
VIAL (EA) INTRAVENOUS
Qty: 0 | Refills: 0 | Status: DISCONTINUED | OUTPATIENT
Start: 2019-01-06 | End: 2019-01-06

## 2019-01-06 RX ORDER — FUROSEMIDE 40 MG
20 TABLET ORAL DAILY
Qty: 0 | Refills: 0 | Status: DISCONTINUED | OUTPATIENT
Start: 2019-01-06 | End: 2019-01-06

## 2019-01-06 RX ORDER — ASPIRIN/CALCIUM CARB/MAGNESIUM 324 MG
81 TABLET ORAL DAILY
Qty: 0 | Refills: 0 | Status: DISCONTINUED | OUTPATIENT
Start: 2019-01-06 | End: 2019-01-14

## 2019-01-06 RX ORDER — ACETAMINOPHEN 500 MG
500 TABLET ORAL
Qty: 0 | Refills: 0 | Status: DISCONTINUED | OUTPATIENT
Start: 2019-01-06 | End: 2019-01-06

## 2019-01-06 RX ORDER — ACETAMINOPHEN 500 MG
325 TABLET ORAL
Qty: 0 | Refills: 0 | Status: DISCONTINUED | OUTPATIENT
Start: 2019-01-06 | End: 2019-01-06

## 2019-01-06 RX ORDER — ACETAMINOPHEN 500 MG
325 TABLET ORAL
Qty: 0 | Refills: 0 | Status: COMPLETED | OUTPATIENT
Start: 2019-01-06 | End: 2019-01-08

## 2019-01-06 RX ORDER — METOPROLOL TARTRATE 50 MG
12.5 TABLET ORAL
Qty: 0 | Refills: 0 | Status: DISCONTINUED | OUTPATIENT
Start: 2019-01-06 | End: 2019-01-06

## 2019-01-06 RX ORDER — METOPROLOL TARTRATE 50 MG
12.5 TABLET ORAL
Qty: 0 | Refills: 0 | Status: DISCONTINUED | OUTPATIENT
Start: 2019-01-06 | End: 2019-01-09

## 2019-01-06 RX ORDER — INSULIN HUMAN 100 [IU]/ML
6 INJECTION, SOLUTION SUBCUTANEOUS ONCE
Qty: 0 | Refills: 0 | Status: COMPLETED | OUTPATIENT
Start: 2019-01-06 | End: 2019-01-06

## 2019-01-06 RX ORDER — FUROSEMIDE 40 MG
20 TABLET ORAL DAILY
Qty: 0 | Refills: 0 | Status: DISCONTINUED | OUTPATIENT
Start: 2019-01-06 | End: 2019-01-12

## 2019-01-06 RX ADMIN — Medication 650 MILLIGRAM(S): at 09:32

## 2019-01-06 RX ADMIN — Medication 50 MILLILITER(S): at 00:36

## 2019-01-06 RX ADMIN — DORZOLAMIDE HYDROCHLORIDE 1 DROP(S): 20 SOLUTION/ DROPS OPHTHALMIC at 00:49

## 2019-01-06 RX ADMIN — Medication 1 DROP(S): at 13:34

## 2019-01-06 RX ADMIN — Medication 81 MILLIGRAM(S): at 13:35

## 2019-01-06 RX ADMIN — Medication 250 MILLIGRAM(S): at 00:49

## 2019-01-06 RX ADMIN — Medication 325 MILLIGRAM(S): at 17:11

## 2019-01-06 RX ADMIN — BRIMONIDINE TARTRATE 1 DROP(S): 2 SOLUTION/ DROPS OPHTHALMIC at 13:34

## 2019-01-06 RX ADMIN — Medication 650 MILLIGRAM(S): at 08:32

## 2019-01-06 RX ADMIN — DORZOLAMIDE HYDROCHLORIDE 1 DROP(S): 20 SOLUTION/ DROPS OPHTHALMIC at 13:34

## 2019-01-06 RX ADMIN — DORZOLAMIDE HYDROCHLORIDE 1 DROP(S): 20 SOLUTION/ DROPS OPHTHALMIC at 21:03

## 2019-01-06 RX ADMIN — DORZOLAMIDE HYDROCHLORIDE 1 DROP(S): 20 SOLUTION/ DROPS OPHTHALMIC at 05:11

## 2019-01-06 RX ADMIN — Medication 12.5 MILLIGRAM(S): at 17:11

## 2019-01-06 RX ADMIN — Medication 325 MILLIGRAM(S): at 22:49

## 2019-01-06 RX ADMIN — CEFEPIME 100 MILLIGRAM(S): 1 INJECTION, POWDER, FOR SOLUTION INTRAMUSCULAR; INTRAVENOUS at 00:49

## 2019-01-06 RX ADMIN — Medication 20 MILLIGRAM(S): at 13:35

## 2019-01-06 RX ADMIN — INSULIN HUMAN 6 UNIT(S): 100 INJECTION, SOLUTION SUBCUTANEOUS at 00:37

## 2019-01-06 RX ADMIN — Medication 325 MILLIGRAM(S): at 21:49

## 2019-01-06 NOTE — DISCHARGE NOTE ADULT - CARE PLAN
Principal Discharge DX:	Cerebrovascular accident (CVA), unspecified mechanism  Goal:	Monitor, rehabilitation  Assessment and plan of treatment:	When you came to the hospital, you had multiple strokes, one older, and multiple new strokes found on brain imaging. Your functional abilities were greatly effected and likely recovery to baseline will not occur. This was discussed with your family and palliative care, and they made the decision for home hospice, so that you can go home and be with them. Home hospice care has been set up by Palliative Care and case management.  Secondary Diagnosis:	Acute on chronic systolic heart failure  Goal:	Treat symptoms  Assessment and plan of treatment:	While in the hospital, you had heart failure. We treated you by giving you water pills to get rid of extra fluid in your body. Please take medications as prescribed.  Secondary Diagnosis:	Stage 3 chronic kidney disease  Goal:	Monitor  Assessment and plan of treatment:	When you arrived at the hospital, your kidney  Secondary Diagnosis:	Encounter for palliative care Principal Discharge DX:	Cerebrovascular accident (CVA), unspecified mechanism  Goal:	Monitor, rehabilitation  Assessment and plan of treatment:	When you came to the hospital, you had multiple strokes, one older, and multiple new strokes found on brain imaging. Your functional abilities were greatly effected and likely recovery to baseline will not occur. This was discussed with your family and palliative care, and they made the decision for home hospice, so that you can go home and be with them. Home hospice care has been set up by Palliative Care and case management.  Secondary Diagnosis:	Acute on chronic systolic heart failure  Goal:	Treat symptoms  Assessment and plan of treatment:	While in the hospital, you had heart failure. We treated you by giving you water pills to get rid of extra fluid in your body. Also, we found some findings on cardiac imaging that show a myopathy (problem with the heart muscle.) Please take medications as prescribed.  Secondary Diagnosis:	Stage 3 chronic kidney disease  Goal:	Monitor  Assessment and plan of treatment:	When you arrived at the hospital, your kidney function was found to be diminished, probably from old age. You also have a murray in place. The visiting nurse that home hospice will provide will attend to your needs.  Secondary Diagnosis:	Encounter for palliative care  Goal:	Goals of Care, Home Hospice  Assessment and plan of treatment:	Your functional abilities were greatly effected by multiple strokes and declining cardiac function, and likely recovery to baseline will not occur. This was discussed with your family and palliative care, and they made the decision for home hospice, so that you can go home and be with them, which they stated was your wish. Home hospice care has been set up by Palliative Care and case management.

## 2019-01-06 NOTE — CONSULT NOTE ADULT - SUBJECTIVE AND OBJECTIVE BOX
Date of Admission: 1/5/2019    CHIEF COMPLAINT: AMS    HISTORY OF PRESENT ILLNESS:  This is a 85yoF w/ PMHx CHF, h/o breast Ca, CVA in december 2018 presents with intermittent confusion, agitation and headache.  Patient being treated with UTI given chronic murray.  Cardiology consulted for elevated hsT 58 and then 81 upon repeat.  EKG shows NSR, no ischemic changes.  Upon examination, patient denies chest pain, SOB, palpitations.  Patient is laying flat on nasal cannular and appeared comfortable, was hemodynamically stable.    Allergies  No Known Allergies    MEDICATIONS:  cefepime   IVPB 1000 milliGRAM(s) IV Intermittent every 24 hours  vancomycin  IVPB 750 milliGRAM(s) IV Intermittent once  vancomycin  IVPB      acetaminophen   Tablet .. 650 milliGRAM(s) Oral every 6 hours PRN  brimonidine 0.2% Ophthalmic Solution 1 Drop(s) Left EYE daily  dorzolamide 2% Ophthalmic Solution 1 Drop(s) Left EYE three times a day  timolol 0.5% Solution 1 Drop(s) Left EYE daily    PAST MEDICAL & SURGICAL HISTORY:  CHF (congestive heart failure)  Breast cancer  CVA (cerebral vascular accident)  No significant past surgical history    FAMILY HISTORY:  Family history of hypertension in grandmother (Grandparent)    REVIEW OF SYSTEMS:  See HPI. Otherwise, 10 point ROS done and otherwise negative.    PHYSICAL EXAM:  T(C): 36.8 (01-05-19 @ 23:44), Max: 36.8 (01-05-19 @ 23:06)  HR: 99 (01-05-19 @ 23:44) (97 - 121)  BP: 123/79 (01-05-19 @ 23:44) (104/53 - 129/70)  RR: 18 (01-05-19 @ 23:44) (15 - 18)  SpO2: 100% (01-05-19 @ 23:44) (98% - 100%)  Wt(kg): --  I&O's Summary    04 Jan 2019 07:01  -  05 Jan 2019 07:00  --------------------------------------------------------  IN: 0 mL / OUT: 600 mL / NET: -600 mL    Appearance: Normal	  HEENT:   Normal oral mucosa, PERRL, EOMI	  Lymphatic: No lymphadenopathy  Cardiovascular: Normal S1 S2, (+) JVD, No murmurs, No edema  Respiratory: Lungs clear to auscultation	  Psychiatry: A & O x 3, Mood & affect appropriate  Gastrointestinal:  Soft, Non-tender, + BS	  Skin: No rashes, No ecchymoses, No cyanosis	  Neurologic: Non-focal  Extremities: Normal range of motion, No clubbing, cyanosis or edema  Vascular: Peripheral pulses palpable 2+ bilaterally    LABS:	 	  CBC Full  -  ( 05 Jan 2019 16:50 )  WBC Count : 7.64 K/uL  Hemoglobin : 11.3 g/dL  Hematocrit : 36.9 %  Platelet Count - Automated : 367 K/uL  Mean Cell Volume : 84.8 fL  Mean Cell Hemoglobin : 26.0 pg  Mean Cell Hemoglobin Concentration : 30.6 %  Auto Neutrophil # : 6.08 K/uL  Auto Lymphocyte # : 0.85 K/uL  Auto Monocyte # : 0.52 K/uL  Auto Eosinophil # : 0.03 K/uL  Auto Basophil # : 0.10 K/uL  Auto Neutrophil % : 79.6 %  Auto Lymphocyte % : 11.1 %  Auto Monocyte % : 6.8 %  Auto Eosinophil % : 0.4 %  Auto Basophil % : 1.3 %    01-05    135  |  96<L>  |  16  ----------------------------<  129<H>  6.0<H>   |  26  |  1.53<H>  01-05    133<L>  |  98  |  15  ----------------------------<  153<H>  5.5<H>   |  15<L>  |  1.46<H>    Ca    9.2      05 Jan 2019 22:05  Ca    9.0      05 Jan 2019 16:50  Phos  5.1     01-05  Mg     2.2     01-05    TPro  7.8  /  Alb  3.6  /  TBili  0.5  /  DBili  x   /  AST  131<H>  /  ALT  157<H>  /  AlkPhos  486<H>  01-05  TPro  7.7  /  Alb  3.3  /  TBili  0.7  /  DBili  x   /  AST  147<H>  /  ALT  161<H>  /  AlkPhos  489<H>  01-05

## 2019-01-06 NOTE — DISCHARGE NOTE ADULT - MEDICATION SUMMARY - MEDICATIONS TO STOP TAKING
I will STOP taking the medications listed below when I get home from the hospital:    Bactrim  mg-160 mg oral tablet  -- 1 tab(s) by mouth every 12 hours for 7 days I will STOP taking the medications listed below when I get home from the hospital:    atorvastatin 40 mg oral tablet  -- 1 tab(s) by mouth once a day (at bedtime)    Metoprolol Succinate ER 25 mg oral tablet, extended release  -- 1 tab(s) by mouth once a day    Bactrim  mg-160 mg oral tablet  -- 1 tab(s) by mouth every 12 hours for 7 days

## 2019-01-06 NOTE — PROGRESS NOTE ADULT - ATTENDING COMMENTS
85F with PMH of CHF (EF=55%), remote hx breast CA, and recent CVA on 12/15 c/b R sided weakness presents with encephalopathy. Course c/b acute on chronic HF exacerbation.   #Encephalopathy - UTI unlikely as UA bland and urine culture negative. Patient without fever and leukocytosis, will monitor off antibiotics at this time. Likely related to recent CVA. Neuro recs appreciated. MRI done today showing age-appropriate involutional and ischemic gliotic changes; Subacute left parietal infarct with mineralization; a few tiny scattered acute infarcts in the bilateral centrum semiovale ovale. C/w ASA, statin on hold 2/2 elevated LFTs.   #Acute on chronic HF - s/p RRT yesterday for volume overload, responded well to lasix 40mg IVPx1, will c/w home lasix for now and reassess volume status. Elevated trops likely related to demand ischemia per cards.  #Transaminitis - unclear etiology (new statin use vs. congestive hepatopathy), will continue to trend. Hold statin at this time. Abd US w/ small ascites and no cholelithiasis.   #RAMANA - baseline Cr 1.13, will hold nephrotoxin, c/w diuresis. Recheck BMP tonight to eval for hyperkalemia   #Dispo - PT/OT, will likely benefit from family GOC discussion

## 2019-01-06 NOTE — PROGRESS NOTE ADULT - PROBLEM SELECTOR PLAN 2
EF55% on outside documentation  mild interstitial edema on cxr  hypoxia and crackles on exam during rapid yesterday after fluid boluses, that responded and improved with lasix   elevated troponins in setting of demand ischemia/hypoxia overload  -monitor  -I/Os   -c/w lasix PRN for fluid overload EF55% on outside documentation  mild interstitial edema on cxr  hypoxia and crackles on exam during rapid yesterday after fluid boluses, that responded and improved with lasix   elevated troponins in setting of demand ischemia/hypoxia overload  -monitor  -I/Os   -resume home lasix 20 po daily and metoprolol 25 (split into 12.5 bid while inpatient) EF=55% on outside documentation  mild interstitial edema on cxr  hypoxia and crackles on exam during rapid yesterday after fluid boluses, that responded and improved with lasix   elevated troponins in setting of demand ischemia/hypoxia overload  -monitor  -I/Os   -resume home lasix 20 po daily and metoprolol 25 (split into 12.5 bid while inpatient)

## 2019-01-06 NOTE — DISCHARGE NOTE ADULT - PATIENT PORTAL LINK FT
You can access the [facility] Patient Portal, offered by [facility], by registering with the following website: [email or web address]

## 2019-01-06 NOTE — DISCHARGE NOTE ADULT - COMMUNITY RESOURCES
HOSPICE CARE NETWORK [phone] 2 HOSPICE RN to start as scheduled by [facility];  CARE AMBULANCE 3pm pickup 1/14/2019

## 2019-01-06 NOTE — DISCHARGE NOTE ADULT - HOSPITAL COURSE
History of Present Illness as documented by the admitting resident: 	  85y Female with PMH of CHF, breast ca ~9 years ago with R breast removal, and CVA on 12/15 presents with approximately 10 days of intermittent confusion, agitation, and headache. Patient's daughter was at bedside and provided the history. After the patient's stroke, she was sent to [place] for rehab as she was no longer able to go about her ADLs for which she was previously independent. At the rehab center, the patient was frustrated and expressed to her family that she wanted to leave and they weren't doing anything for her. Sometimes the patient would be confused or agitated. About a week and a half ago the episodes of confusion and agitation became more frequent and worse in severity. The family was concerned, and the patient started complaining of abdominal pain at that time. The patient had a Rader catheter placed after her stroke for urinary retention, and the facility attempted to remove and then replaced the Rader for continued retention but without relief of the patient's pain at the time. More recently, the patient had been complaining of intense headache, and received Tylenol without much relief of her pain. These symptoms did not improve and the patient's family insisted on her evaluation at [facility] ED for the headaches and confusion. Her daughter denies that the patient had any fevers, CP/SOB, nausea/vomiting, dysuria or diarrhea, or changes in BM. The patient was on a mechanical soft diet at [place], and had some decreased appetite yesterday. She did have a normal BM yesterday and passed flatus more recently.    Hospital Course:  The patient was admitted to the medical floors. Her initial admission was complicated by acute decompensated heart failure in the setting of IV fluid hydration, leading to pulmonary edema and hypoxia that improved with IV lasix and was able to be weaned off oxygen supplementation within 12 hours of the event. The patient's mental status was described as being at her baseline post-stroke shortly after by her family, and they requested improved pain control, which was performed by careful administration of Tylenol. Neurology recommendations were followed, including CTA, MRI, and EEG for encephalopathy workup. Findings included DELMAR thrombus, evolving L infarct, and acute infarcts in the bilateral centrum semiovale. Laboratory findings of elevated troponins, transaminases, and potassium levels were monitored closely and treated as appropriate. PT and OT were initiated early the patient's hospital course and she was recommended further rehab stay.    The patient was seen and evaluated and deemed stable for discharge. History of Present Illness as documented by the admitting resident: 	  85y Female with PMH of CHF, breast ca ~9 years ago with R breast removal, and CVA on 12/15 presents with approximately 10 days of intermittent confusion, agitation, and headache. Patient's daughter was at bedside and provided the history. After the patient's stroke, she was sent to [place] for rehab as she was no longer able to go about her ADLs for which she was previously independent. At the rehab center, the patient was frustrated and expressed to her family that she wanted to leave and they weren't doing anything for her. Sometimes the patient would be confused or agitated. About a week and a half ago the episodes of confusion and agitation became more frequent and worse in severity. The family was concerned, and the patient started complaining of abdominal pain at that time. The patient had a Rader catheter placed after her stroke for urinary retention, and the facility attempted to remove and then replaced the Rader for continued retention but without relief of the patient's pain at the time. More recently, the patient had been complaining of intense headache, and received Tylenol without much relief of her pain. These symptoms did not improve and the patient's family insisted on her evaluation at [facility] ED for the headaches and confusion. Her daughter denies that the patient had any fevers, CP/SOB, nausea/vomiting, dysuria or diarrhea, or changes in BM. The patient was on a mechanical soft diet at [place], and had some decreased appetite yesterday. She did have a normal BM yesterday and passed flatus more recently.    Hospital Course:  The patient was admitted to the medical floors. Her initial admission was complicated by acute decompensated heart failure in the setting of IV fluid hydration, leading to pulmonary edema and hypoxia that improved with IV lasix and was able to be weaned off oxygen supplementation within 12 hours of the event. The patient's mental status was described as being at her baseline post-stroke shortly after by her family, and they requested improved pain control, which was performed by careful administration of Tylenol and dilaudid. Patient's mental status deteriorated and she became AAOx0. Neurology recommendations were followed, including CTA, MRI, and EEG for encephalopathy workup. Findings included DELMAR thrombus, evolving L infarct, and acute infarcts in the bilateral centrum semiovale. Laboratory findings of elevated troponins, transaminases, and potassium levels were monitored closely and treated as appropriate. 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 PT and OT were initiated early the patient's hospital course and she was recommended further rehab stay. However GOC with family and palliative care discussed poor prognosis, and family decided on home hospice, comfort care on 1/10/19, MOLST and DNR/DNI completed. Home hospice services coordinated for patient and family. Once home equipment delivered, patient was discharged home with hospice.

## 2019-01-06 NOTE — PROGRESS NOTE ADULT - PROBLEM SELECTOR PLAN 4
Creatinine elevated, no hx of NSAID use  -avoid nephrotoxins  -renal dosing  -monitor CMP Creatinine elevated, no hx of NSAID use; prior 1.13 in copies in medical chart  -avoid nephrotoxins  -renal dosing  -monitor CMP

## 2019-01-06 NOTE — PROGRESS NOTE ADULT - PROBLEM SELECTOR PLAN 5
with recent tylenol use for headaches and on statin  obtain urine tox for acetaminophen  hold statin  may c/w acetaminophen carefully for pain (maintain < 4g per day) with recent tylenol use for headaches and on statin unclear when initiated, may also be 2/2 HF and hepatic congestion  obtain urine tox for acetaminophen  hold statin  obtain hep panel in AM  may c/w acetaminophen carefully for pain (maintain < 4g per day)

## 2019-01-06 NOTE — DISCHARGE NOTE ADULT - PLAN OF CARE
Monitor, rehabilitation When you came to the hospital, you had multiple strokes, one older, and multiple new strokes found on brain imaging. Your functional abilities were greatly effected and likely recovery to baseline will not occur. This was discussed with your family and palliative care, and they made the decision for home hospice, so that you can go home and be with them. Home hospice care has been set up by Palliative Care and case management. Treat symptoms While in the hospital, you had heart failure. We treated you by giving you water pills to get rid of extra fluid in your body. Please take medications as prescribed. Monitor When you arrived at the hospital, your kidney While in the hospital, you had heart failure. We treated you by giving you water pills to get rid of extra fluid in your body. Also, we found some findings on cardiac imaging that show a myopathy (problem with the heart muscle.) Please take medications as prescribed. When you arrived at the hospital, your kidney function was found to be diminished, probably from old age. You also have a murray in place. The visiting nurse that home hospice will provide will attend to your needs. Goals of Care, Home Hospice Your functional abilities were greatly effected by multiple strokes and declining cardiac function, and likely recovery to baseline will not occur. This was discussed with your family and palliative care, and they made the decision for home hospice, so that you can go home and be with them, which they stated was your wish. Home hospice care has been set up by Palliative Care and case management.

## 2019-01-06 NOTE — PROGRESS NOTE ADULT - SUBJECTIVE AND OBJECTIVE BOX
Patient is a 85y old  Female who presents with a chief complaint of encephalopathy (2019 00:48)      SUBJECTIVE / OVERNIGHT EVENTS:  Patient seen and examined at bedside. This morning, she is resting comfortably in bed and reports no new issues or overnight events.     She reports:  [  ] CP  [  ] SOB  [  ] Fever  [  ] N /  [  ] V  [  ] Diarrhea  [X] None of the above    Other Review of Systems Negative.    MEDICATIONS  (STANDING):  brimonidine 0.2% Ophthalmic Solution 1 Drop(s) Left EYE daily  cefepime   IVPB 1000 milliGRAM(s) IV Intermittent every 24 hours  dorzolamide 2% Ophthalmic Solution 1 Drop(s) Left EYE three times a day  timolol 0.5% Solution 1 Drop(s) Left EYE daily  vancomycin  IVPB        MEDICATIONS  (PRN):  acetaminophen   Tablet .. 650 milliGRAM(s) Oral every 6 hours PRN Temp greater or equal to 38C (100.4F), Moderate Pain (4 - 6), Severe Pain (7 - 10)      OBJECTIVE:    Vital Signs Last 24 Hrs  T(C): 36.3 (2019 05:10), Max: 36.8 (2019 23:06)  T(F): 97.3 (2019 05:10), Max: 98.3 (2019 23:44)  HR: 99 (2019 05:10) (97 - 121)  BP: 114/75 (2019 05:10) (113/72 - 129/70)  BP(mean): --  RR: 18 (2019 05:10) (16 - 18)  SpO2: 100% (2019 05:10) (98% - 100%)     CAPILLARY BLOOD GLUCOSE      POCT Blood Glucose.: 151 mg/dL (2019 16:37)  POCT Blood Glucose.: 151 mg/dL (2019 15:26)    I&O's Summary      PHYSICAL EXAM:  	GENERAL: Mild distress, thin,   	HEAD:  Atraumatic, Normocephalic  	EYES: EOMI, conjunctiva and sclera clear  	NECK: Supple, No obvious JVD  	CHEST/LUNG: Soft crackles in bases posteriorly ; No wheeze  	HEART: Fast rate and rhythm; No murmurs, rubs, or gallops  	ABDOMEN: Soft, Nontender, Nondistended; Bowel sounds present  	EXTREMITIES:  2+ Peripheral Pulses, No clubbing, cyanosis, or LE edema  	PSYCH: AAOx0-1, unable to state own name, says I can't say, but does respond to her name  	NEUROLOGY: non-focal, gross UE strength R< L  SKIN: No rashes or lesions    LABS:                        11.3   7.64  )-----------( 367      ( 2019 16:50 )             36.9     Auto Eosinophil # 0.03  / Auto Eosinophil % 0.4   / Auto Neutrophil # 6.08  / Auto Neutrophil % 79.6  / BANDS % x                            10.0   6.56  )-----------( 376      ( 2019 05:00 )             31.0     Auto Eosinophil # 0.25  / Auto Eosinophil % 3.8   / Auto Neutrophil # 4.32  / Auto Neutrophil % 65.9  / BANDS % x            135  |  96<L>  |  16  ----------------------------<  129<H>  6.0<H>   |  26  |  1.53<H>      133<L>  |  98  |  15  ----------------------------<  153<H>  5.5<H>   |  15<L>  |  1.46<H>      138  |  100  |  15  ----------------------------<  96  5.2   |  25  |  1.46<H>    Ca    9.2      2019 22:05  Mg     2.2       Phos  5.1       TPro  7.8  /  Alb  3.6  /  TBili  0.5  /  DBili  x   /  AST  131<H>  /  ALT  157<H>  /  AlkPhos  486<H>    TPro  7.7  /  Alb  3.3  /  TBili  0.7  /  DBili  x   /  AST  147<H>  /  ALT  161<H>  /  AlkPhos  489<H>    TPro  7.1  /  Alb  3.3  /  TBili  0.4  /  DBili  x   /  AST  201<H>  /  ALT  178<H>  /  AlkPhos  476<H>        CARDIAC MARKERS ( 2019 22:05 )  x     / x     / 94 u/L / 3.69 ng/mL / x      CARDIAC MARKERS ( 2019 16:50 )  x     / x     / 87 u/L / 3.10 ng/mL / x          Urinalysis Basic - ( 2019 19:08 )    Color: COLORLESS / Appearance: CLEAR / S.016 / pH: 6.0  Gluc: NEGATIVE / Ketone: NEGATIVE  / Bili: NEGATIVE / Urobili: NORMAL   Blood: SMALL / Protein: NEGATIVE / Nitrite: NEGATIVE   Leuk Esterase: SMALL / RBC: 6-10 / WBC 0-2   Sq Epi: FEW / Non Sq Epi: x / Bacteria: NEGATIVE            ABG: ( 2019 16:50 ) pH: 7.32  /  pCO2: 44    /  pO2: 137   / HCO3: 21    / Base Excess: -3.4  /  SaO2: 98.6                VBG: ( 01:11 ) - VBG - pH: 7.33  | pCO2: 59    | pO2: 24    | Lactate: 2.9        Microbiology:        Care Discussed with Consultants/Other Providers:    RADIOLOGY & ADDITIONAL TESTS:  (Imaging Personally Reviewed) Patient is a 85y old  Female who presents with a chief complaint of encephalopathy (2019 00:48)      SUBJECTIVE / OVERNIGHT EVENTS:  Overnight additional follow-up labs were sent after the rapid response to follow-up on hyperkalemia, and an elevated lactate level as documented in the resident chart note. No other acute events were reported.  Discussed with patient's other daughter this morning at bedside. She said that they were primarily concerned over the pain the patient had been exclaiming of at [place], and that this morning she was acting accordingly with her new baseline since the stroke. They understood she was confused and may not be able to ask for pain medication when she needs it, and may complain severely though there might not be a medical cause for the pain. They were reassured that we would be pursuing the MRI with neurology's recommendations and that we would attempt to physically examine the patient for anything that may be related to her complaint of pain. This morning the patient had told her daughter she had R arm pain. When asked, the patient said she did not have pain at the moment.    She reports:  [  ] CP  [  ] SOB  [  ] Fever  [  ] N /  [  ] V  [  ] Diarrhea  [X] None of the above    Other Review of Systems Negative.    MEDICATIONS  (STANDING):  brimonidine 0.2% Ophthalmic Solution 1 Drop(s) Left EYE daily  cefepime   IVPB 1000 milliGRAM(s) IV Intermittent every 24 hours  dorzolamide 2% Ophthalmic Solution 1 Drop(s) Left EYE three times a day  timolol 0.5% Solution 1 Drop(s) Left EYE daily  vancomycin  IVPB        MEDICATIONS  (PRN):  acetaminophen   Tablet .. 650 milliGRAM(s) Oral every 6 hours PRN Temp greater or equal to 38C (100.4F), Moderate Pain (4 - 6), Severe Pain (7 - 10)      OBJECTIVE:    Vital Signs Last 24 Hrs  T(C): 36.3 (2019 05:10), Max: 36.8 (2019 23:06)  T(F): 97.3 (2019 05:10), Max: 98.3 (2019 23:44)  HR: 99 (2019 05:10) (97 - 121)  BP: 114/75 (2019 05:10) (113/72 - 129/70)  BP(mean): --  RR: 18 (2019 05:10) (16 - 18)  SpO2: 100% (2019 05:10) (98% - 100%)     CAPILLARY BLOOD GLUCOSE      POCT Blood Glucose.: 151 mg/dL (2019 16:37)  POCT Blood Glucose.: 151 mg/dL (2019 15:26)    I&O's Summary      PHYSICAL EXAM:  	GENERAL: Mild distress, thin,   	HEAD:  Atraumatic, Normocephalic  	EYES: EOMI, conjunctiva and sclera clear  	NECK: Supple, No obvious JVD  	CHEST/LUNG: Soft crackles in bases posteriorly ; No wheeze  	HEART: Fast rate and rhythm; No murmurs, rubs, or gallops  	ABDOMEN: Soft, Nontender, Nondistended; Bowel sounds present  	EXTREMITIES:  2+ Peripheral Pulses, No clubbing, cyanosis, or LE edema  	PSYCH: AAOx0-1, unable to state own name, says I can't say, but does respond to her name  	NEUROLOGY: non-focal, gross UE strength R< L  SKIN: No rashes or lesions    LABS:                        11.3   7.64  )-----------( 367      ( 2019 16:50 )             36.9     Auto Eosinophil # 0.03  / Auto Eosinophil % 0.4   / Auto Neutrophil # 6.08  / Auto Neutrophil % 79.6  / BANDS % x                            10.0   6.56  )-----------( 376      ( 2019 05:00 )             31.0     Auto Eosinophil # 0.25  / Auto Eosinophil % 3.8   / Auto Neutrophil # 4.32  / Auto Neutrophil % 65.9  / BANDS % x        01-05    135  |  96<L>  |  16  ----------------------------<  129<H>  6.0<H>   |  26  |  1.53<H>  01-05    133<L>  |  98  |  15  ----------------------------<  153<H>  5.5<H>   |  15<L>  |  1.46<H>  -05    138  |  100  |  15  ----------------------------<  96  5.2   |  25  |  1.46<H>    Ca    9.2      2019 22:05  Mg     2.2       Phos  5.1       TPro  7.8  /  Alb  3.6  /  TBili  0.5  /  DBili  x   /  AST  131<H>  /  ALT  157<H>  /  AlkPhos  486<H>    TPro  7.7  /  Alb  3.3  /  TBili  0.7  /  DBili  x   /  AST  147<H>  /  ALT  161<H>  /  AlkPhos  489<H>    TPro  7.1  /  Alb  3.3  /  TBili  0.4  /  DBili  x   /  AST  201<H>  /  ALT  178<H>  /  AlkPhos  476<H>        CARDIAC MARKERS ( 2019 22:05 )  x     / x     / 94 u/L / 3.69 ng/mL / x      CARDIAC MARKERS ( 2019 16:50 )  x     / x     / 87 u/L / 3.10 ng/mL / x          Urinalysis Basic - ( 2019 19:08 )    Color: COLORLESS / Appearance: CLEAR / S.016 / pH: 6.0  Gluc: NEGATIVE / Ketone: NEGATIVE  / Bili: NEGATIVE / Urobili: NORMAL   Blood: SMALL / Protein: NEGATIVE / Nitrite: NEGATIVE   Leuk Esterase: SMALL / RBC: 6-10 / WBC 0-2   Sq Epi: FEW / Non Sq Epi: x / Bacteria: NEGATIVE            ABG: ( 2019 16:50 ) pH: 7.32  /  pCO2: 44    /  pO2: 137   / HCO3: 21    / Base Excess: -3.4  /  SaO2: 98.6                VBG: ( 01:11 ) - VBG - pH: 7.33  | pCO2: 59    | pO2: 24    | Lactate: 2.9        Microbiology:        Care Discussed with Consultants/Other Providers:    RADIOLOGY & ADDITIONAL TESTS:  (Imaging Personally Reviewed)

## 2019-01-06 NOTE — PROGRESS NOTE ADULT - ASSESSMENT
85y Female with PMH of CHF, breast ca ~9 years ago with R breast removal, and CVA on 12/15 presents with encephalopathy, unlikely to be UTI with negative UA, and acute decompensated HF. 85y Female with PMH of CHF, breast ca ~9 years ago with R breast removal, and CVA on 12/15 presents with encephalopathy, unlikely to be UTI with negative UA, and acute decompensated HF. Patient's mental status back to her new baseline s/p CVA, as discussed with family, and pending remainder of neuro work up to rule out acute CVA in the interim.

## 2019-01-06 NOTE — CHART NOTE - NSCHARTNOTEFT_GEN_A_CORE
Follow up BMP shows K+ 6 wtihout hemolysis.  EKG stat shows sinus tachy () Left axis deviation, no QT and CO interval changes, no QRS widening, and no Peaked T waves.  ST elevation on V2 and V3 seen (less than 1mm), poor RwP and no new TwI. Overall EKG is consistent with prior EKG yesterday (1/5/2019)  Patient was given D50 50cc and insulin regular 6 units (given elevation in Cr)  IV push for her hyperkalemia. Albuterol nebulizer not used due to tachycardia.  Patient remained asymptomatic at the time of bedside check.  Patient's family updated on the reasoning behind hyperkalemia workup as well as lab results.    Troponin T was also elevated from 58 to 81 in setting of worsening Cr and tachycardia.   Cardiology was consulted per day team's sign out request.  EKG as above.   More likely demand mediated in setting of tachycardia and worsening kidney clearance. Less concerning for acute CAD.   Will follow up with Cardiology recs.    VBG was cancelled by lab. Reordered. Will follow up with Lactate level.    Romeo Pride  PGY-1

## 2019-01-06 NOTE — DISCHARGE NOTE ADULT - MEDICATION SUMMARY - MEDICATIONS TO TAKE
I will START or STAY ON the medications listed below when I get home from the hospital:    acetaminophen 325 mg oral tablet  -- 1 tab(s) by mouth every 6 hours, As needed, Mild Pain (1 - 3)  -- Indication: For Pain    Dilaudid 2 mg oral tablet  -- 0.5 tab(s) by mouth every 4 hours, As Needed for severe pain MDD:Max 6 mg daily  -- Caution federal law prohibits the transfer of this drug to any person other  than the person for whom it was prescribed.  May cause drowsiness.  Alcohol may intensify this effect.  Use care when operating dangerous machinery.  This prescription cannot be refilled.  Using more of this medication than prescribed may cause serious breathing problems.    -- Indication: For Pain    aspirin 81 mg oral delayed release tablet  -- 1 tab(s) by mouth once a day  -- Indication: For ACS (acute coronary syndrome)    atorvastatin 40 mg oral tablet  -- 1 tab(s) by mouth once a day (at bedtime)  -- Indication: For ACS (acute coronary syndrome)    Metoprolol Succinate ER 25 mg oral tablet, extended release  -- 1 tab(s) by mouth once a day  -- Indication: For Acute on chronic systolic heart failure    furosemide 20 mg oral tablet  -- 1 tab(s) by mouth once a day  -- Indication: For Acute on chronic systolic heart failure    docusate sodium 100 mg oral capsule  -- 1 cap(s) by mouth 3 times a day  -- Indication: For Constipation    Senna 8.6 mg oral tablet  -- 2 tab(s) by mouth once a day (at bedtime)  -- Indication: For Constipation    polyethylene glycol 3350 oral powder for reconstitution  -- 17 gram(s) by mouth 2 times a day  -- Indication: For Constipation    brimonidine 0.2% ophthalmic solution  -- 1 drop(s) to LEFT eye once a day  -- Indication: For Eye drop     dorzolamide 2% ophthalmic solution  -- 1 drop(s) to LEFT eye once a day  -- Indication: For Eye drop    timolol maleate 0.5% ophthalmic solution  -- 1 drop(s) to LEFT eye once a day  -- Indication: For Eye drop I will START or STAY ON the medications listed below when I get home from the hospital:    acetaminophen 325 mg oral tablet  -- 1 tab(s) by mouth every 6 hours, As needed, Mild Pain (1 - 3)  -- Indication: For Pain    Dilaudid 2 mg oral tablet  -- 0.5 tab(s) by mouth every 4 hours, As Needed for severe pain MDD:Max 6 mg daily  -- Caution federal law prohibits the transfer of this drug to any person other  than the person for whom it was prescribed.  May cause drowsiness.  Alcohol may intensify this effect.  Use care when operating dangerous machinery.  This prescription cannot be refilled.  Using more of this medication than prescribed may cause serious breathing problems.    -- Indication: For Pain    aspirin 81 mg oral delayed release tablet  -- 1 tab(s) by mouth once a day  -- Indication: For ACS (acute coronary syndrome)    furosemide 20 mg oral tablet  -- 1 tab(s) by mouth once a day  -- Indication: For Acute on chronic systolic heart failure    docusate sodium 100 mg oral capsule  -- 1 cap(s) by mouth 3 times a day  -- Indication: For Constipation    Senna 8.6 mg oral tablet  -- 2 tab(s) by mouth once a day (at bedtime)  -- Indication: For Constipation    polyethylene glycol 3350 oral powder for reconstitution  -- 17 gram(s) by mouth 2 times a day  -- Indication: For Constipation    brimonidine 0.2% ophthalmic solution  -- 1 drop(s) to LEFT eye once a day  -- Indication: For Eye drop     dorzolamide 2% ophthalmic solution  -- 1 drop(s) to LEFT eye once a day  -- Indication: For Eye drop    timolol maleate 0.5% ophthalmic solution  -- 1 drop(s) to LEFT eye once a day  -- Indication: For Eye drop

## 2019-01-06 NOTE — PROGRESS NOTE ADULT - PROBLEM SELECTOR PLAN 3
recent stroke and evolving lesion seen on head CT with continued MS changes  f/u neuro recs: obtain MRI recent stroke and evolving lesion seen on head CT with continued MS changes  f/u neuro recs: obtain MRI  resume diet with previous rec dysphagia type

## 2019-01-06 NOTE — PROGRESS NOTE ADULT - PROBLEM SELECTOR PLAN 1
differential includes UTI and meningitis, meningitis less likely but fever/wbc may be suppressed in elderly pt using frequent tylenol, however repeat UA after [place] was negative  in setting of recent stroke  -May likely d/c antibiotics for CAUTI: vanc and cefepime - as UA from [place] was negative  -f/u neuro recs: obtain MRI differential includes UTI and meningitis, meningitis less likely but fever/wbc may be suppressed in elderly pt using frequent tylenol, however repeat UA after [place] was negative  in setting of recent stroke, collateral information from family suggests patient is closer to new baseline since CVA, and primary concern is her complaints of pain  -May likely d/c antibiotics for CAUTI: vanc and cefepime - as UA from [place] was negative  -f/u neuro recs: obtain MRI differential includes UTI and meningitis, meningitis less likely but fever/wbc may be suppressed in elderly pt using frequent tylenol, however repeat UA after new [place] was negative  in setting of recent stroke, collateral information from family suggests patient is closer to new baseline since CVA, and primary concern is her complaints of pain  -May likely d/c antibiotics for CAUTI: vanc and cefepime - as UA from [place] was negative  -f/u neuro recs: obtain MRI  -for patient complaints of pain in setting of AMS s/p CVA:  --standing tylenol therapy (325 bid - based on pharmacy pill size and elevated transaminases), and prn 325 [id number] with MDD for this patient as 2G. Standing therapy will d/c after 2 days on 1/8 PM

## 2019-01-07 LAB
BACTERIA UR CULT: SIGNIFICANT CHANGE UP
SPECIMEN SOURCE: SIGNIFICANT CHANGE UP

## 2019-01-07 PROCEDURE — 99233 SBSQ HOSP IP/OBS HIGH 50: CPT | Mod: GC

## 2019-01-07 RX ORDER — THIAMINE MONONITRATE (VIT B1) 100 MG
500 TABLET ORAL EVERY 8 HOURS
Qty: 0 | Refills: 0 | Status: COMPLETED | OUTPATIENT
Start: 2019-01-07 | End: 2019-01-09

## 2019-01-07 RX ORDER — HEPARIN SODIUM 5000 [USP'U]/ML
5000 INJECTION INTRAVENOUS; SUBCUTANEOUS EVERY 12 HOURS
Qty: 0 | Refills: 0 | Status: DISCONTINUED | OUTPATIENT
Start: 2019-01-07 | End: 2019-01-14

## 2019-01-07 RX ADMIN — BRIMONIDINE TARTRATE 1 DROP(S): 2 SOLUTION/ DROPS OPHTHALMIC at 13:06

## 2019-01-07 RX ADMIN — Medication 81 MILLIGRAM(S): at 17:14

## 2019-01-07 RX ADMIN — Medication 105 MILLIGRAM(S): at 22:06

## 2019-01-07 RX ADMIN — DORZOLAMIDE HYDROCHLORIDE 1 DROP(S): 20 SOLUTION/ DROPS OPHTHALMIC at 13:06

## 2019-01-07 RX ADMIN — Medication 12.5 MILLIGRAM(S): at 05:13

## 2019-01-07 RX ADMIN — Medication 325 MILLIGRAM(S): at 06:13

## 2019-01-07 RX ADMIN — Medication 325 MILLIGRAM(S): at 17:13

## 2019-01-07 RX ADMIN — HEPARIN SODIUM 5000 UNIT(S): 5000 INJECTION INTRAVENOUS; SUBCUTANEOUS at 17:13

## 2019-01-07 RX ADMIN — Medication 20 MILLIGRAM(S): at 05:13

## 2019-01-07 RX ADMIN — Medication 325 MILLIGRAM(S): at 17:14

## 2019-01-07 RX ADMIN — Medication 1 DROP(S): at 13:06

## 2019-01-07 RX ADMIN — DORZOLAMIDE HYDROCHLORIDE 1 DROP(S): 20 SOLUTION/ DROPS OPHTHALMIC at 05:13

## 2019-01-07 RX ADMIN — DORZOLAMIDE HYDROCHLORIDE 1 DROP(S): 20 SOLUTION/ DROPS OPHTHALMIC at 22:06

## 2019-01-07 RX ADMIN — Medication 325 MILLIGRAM(S): at 05:13

## 2019-01-07 NOTE — OCCUPATIONAL THERAPY INITIAL EVALUATION ADULT - PATIENT/FAMILY/SIGNIFICANT OTHER GOALS STATEMENT, OT EVAL
Pt. unable to formulate a goal secondary to garbled speech and noted difficulty following commands. Daughter bedside with goal for pt to improve performance with functional mobility and self-care tasks.

## 2019-01-07 NOTE — OCCUPATIONAL THERAPY INITIAL EVALUATION ADULT - RANGE OF MOTION EXAMINATION, UPPER EXTREMITY
except Bilateral Shoulder Flexion Active Assistive ROM 0-90 degrees/bilateral UE Active Assistive ROM was WFL  (within functional limits)

## 2019-01-07 NOTE — PROGRESS NOTE ADULT - SUBJECTIVE AND OBJECTIVE BOX
***************************************************************  Sharmila Omergiovanna, PGY1  Internal Medicine   pager [phone]  ***************************************************************    HONEY MOREL  85y  MRN: [id number]    Patient is a 85y old woman admitted for encephalopathy.       Subjective: Patient was seen and examined at the bedside. There were no acute events overnight. She is AAO x1. Daughter Carla was at the bedside. Reported patient has not been ambulating and has been altered since CVA in 2018. Discussed social work intervention, patient's daughter is amenable.     MEDICATIONS  (STANDING):  acetaminophen   Tablet .. 325 milliGRAM(s) Oral two times a day  aspirin enteric coated 81 milliGRAM(s) Oral daily  brimonidine 0.2% Ophthalmic Solution 1 Drop(s) Left EYE daily  dorzolamide 2% Ophthalmic Solution 1 Drop(s) Left EYE three times a day  furosemide    Tablet 20 milliGRAM(s) Oral daily  metoprolol tartrate 12.5 milliGRAM(s) Oral two times a day  timolol 0.5% Solution 1 Drop(s) Left EYE daily    MEDICATIONS  (PRN):  acetaminophen   Tablet .. 325 milliGRAM(s) Oral every 6 hours PRN Mild Pain (1 - 3), Moderate Pain (4 - 6), Severe Pain (7 - 10)      Objective:    Vitals: Vital Signs Last 24 Hrs  T(C): 36.7 (19 @ 05:11), Max: 36.7 (19 @ 19:42)  T(F): 98.1 (19 @ 05:11), Max: 98.1 (19 @ 05:11)  HR: 146 (19 @ 07:39) (92 - 146)  BP: 105/67 (19 @ 05:11) (100/63 - 115/80)  BP(mean): --  RR: 18 (19 @ 05:11) (16 - 18)  SpO2: 100% (19 @ 05:11) (99% - 100%)            I&O's Summary    2019 07:  -  2019 07:00  --------------------------------------------------------  IN: 200 mL / OUT: 200 mL / NET: 0 mL        PHYSICAL EXAM:  GENERAL: Elderly woman, minimally responsive to questions, cannot be redirected  HEAD:  Poor dentition  EYES: Conjunctiva and sclera clear  CHEST/LUNG: Clear to auscultation bilaterally; No rales, rhonchi, wheezing, or rubs  HEART: Regular rate and rhythm; No murmurs, rubs, or gallops.  ABDOMEN: Soft, Nontender, Nondistended, BS+   SKIN: Dry appearing  NERVOUS SYSTEM:  Alert & Oriented X1, unable to further assess due to patient mental status    LABS:      141  |  104  |  14  ----------------------------<  96  3.9   |  28  |  0.52      135  |  96<L>  |  17  ----------------------------<  85  4.9   |  25  |  1.47<H>      135  |  96<L>  |  16  ----------------------------<  129<H>  6.0<H>   |  26  |  1.53<H>    Ca    7.9<L>      2019 18:30  Ca    9.9      2019 08:20  Ca    9.2      2019 22:05  Phos  3.4       Mg     2.1         TPro  8.0  /  Alb  3.6  /  TBili  0.6  /  DBili  x   /  AST  114<H>  /  ALT  148<H>  /  AlkPhos  473<H>    TPro  7.8  /  Alb  3.6  /  TBili  0.5  /  DBili  x   /  AST  131<H>  /  ALT  157<H>  /  AlkPhos  486<H>    TPro  7.7  /  Alb  3.3  /  TBili  0.7  /  DBili  x   /  AST  147<H>  /  ALT  161<H>  /  AlkPhos  489<H>        PT/INR - ( 2019 08:20 )   PT: 11.9 SEC;   INR: 1.07          PTT - ( 2019 08:20 )  PTT:26.3 SEC              Urinalysis Basic - ( 2019 19:08 )    Color: COLORLESS / Appearance: CLEAR / S.016 / pH: 6.0  Gluc: NEGATIVE / Ketone: NEGATIVE  / Bili: NEGATIVE / Urobili: NORMAL   Blood: SMALL / Protein: NEGATIVE / Nitrite: NEGATIVE   Leuk Esterase: SMALL / RBC: 6-10 / WBC 0-2   Sq Epi: FEW / Non Sq Epi: x / Bacteria: NEGATIVE      ABG - ( 2019 16:50 )  pH, Arterial: 7.32  pH, Blood: x     /  pCO2: 44    /  pO2: 137   / HCO3: 21    / Base Excess: -3.4  /  SaO2: 98.6                                    11.4   7.70  )-----------( 352      ( 2019 08:20 )             35.9                         11.3   7.64  )-----------( 367      ( 2019 16:50 )             36.9                         10.0   6.56  )-----------( 376      ( 2019 05:00 )             31.0     CAPILLARY BLOOD GLUCOSE      POCT Blood Glucose.: 152 mg/dL (2019 21:47)  POCT Blood Glucose.: 139 mg/dL (2019 17:25)      RADIOLOGY & ADDITIONAL TESTS:    Imaging Personally Reviewed:  [x ] YES  [ ] NO    Consultants involved in case:   Consultant(s) Notes Reviewed:  [ x] YES  [ ] NO:   Care Discussed with Consultants/Other Providers [x ] YES  [ ] NO ***************************************************************  Sharmila Omergiovanna, PGY1  Internal Medicine   pager [phone]  ***************************************************************  HONEY MOREL  85y  MRN: [id number]    Patient is a 85y old woman admitted for encephalopathy.     Subjective: Patient was seen and examined at the bedside. There were no acute events overnight. She is AAO x1. Daughter Carla was at the bedside. Reported patient has not been ambulating and has been altered since CVA in 2018. Discussed social work intervention, patient's daughter is amenable.     MEDICATIONS  (STANDING):  acetaminophen   Tablet .. 325 milliGRAM(s) Oral two times a day  aspirin enteric coated 81 milliGRAM(s) Oral daily  brimonidine 0.2% Ophthalmic Solution 1 Drop(s) Left EYE daily  dorzolamide 2% Ophthalmic Solution 1 Drop(s) Left EYE three times a day  furosemide    Tablet 20 milliGRAM(s) Oral daily  metoprolol tartrate 12.5 milliGRAM(s) Oral two times a day  timolol 0.5% Solution 1 Drop(s) Left EYE daily    MEDICATIONS  (PRN):  acetaminophen   Tablet .. 325 milliGRAM(s) Oral every 6 hours PRN Mild Pain (1 - 3), Moderate Pain (4 - 6), Severe Pain (7 - 10)    Vitals: Vital Signs Last 24 Hrs  T(C): 36.7 (19 @ 05:11), Max: 36.7 (19 @ 19:42)  T(F): 98.1 (19 @ 05:11), Max: 98.1 (19 @ 05:11)  HR: 146 (19 @ 07:39) (92 - 146)  BP: 105/67 (19 @ 05:11) (100/63 - 115/80)  BP(mean): --  RR: 18 (19 @ 05:11) (16 - 18)  SpO2: 100% (19 @ 05:11) (99% - 100%)            I&O's Summary    2019 07:  -  2019 07:00  --------------------------------------------------------  IN: 200 mL / OUT: 200 mL / NET: 0 mL    PHYSICAL EXAM:  GENERAL: Elderly woman, minimally responsive to questions, cannot be redirected  HEAD:  Poor dentition  EYES: Conjunctiva and sclera clear  CHEST/LUNG: Clear to auscultation bilaterally; No rales, rhonchi, wheezing, or rubs  HEART: Regular rate and rhythm; No murmurs, rubs, or gallops.  ABDOMEN: Soft, Nontender, Nondistended, BS+   SKIN: Dry appearing  NERVOUS SYSTEM:  Alert & Oriented X1, unable to further assess due to patient mental status    LABS:      141  |  104  |  14  ----------------------------<  96  3.9   |  28  |  0.52      135  |  96<L>  |  17  ----------------------------<  85  4.9   |  25  |  1.47<H>      135  |  96<L>  |  16  ----------------------------<  129<H>  6.0<H>   |  26  |  1.53<H>    Ca    7.9<L>      2019 18:30  Ca    9.9      2019 08:20  Ca    9.2      2019 22:05  Phos  3.4       Mg     2.1         TPro  8.0  /  Alb  3.6  /  TBili  0.6  /  DBili  x   /  AST  114<H>  /  ALT  148<H>  /  AlkPhos  473<H>    TPro  7.8  /  Alb  3.6  /  TBili  0.5  /  DBili  x   /  AST  131<H>  /  ALT  157<H>  /  AlkPhos  486<H>    TPro  7.7  /  Alb  3.3  /  TBili  0.7  /  DBili  x   /  AST  147<H>  /  ALT  161<H>  /  AlkPhos  489<H>        PT/INR - ( 2019 08:20 )   PT: 11.9 SEC;   INR: 1.07          PTT - ( 2019 08:20 )  PTT:26.3 SEC              Urinalysis Basic - ( 2019 19:08 )    Color: COLORLESS / Appearance: CLEAR / S.016 / pH: 6.0  Gluc: NEGATIVE / Ketone: NEGATIVE  / Bili: NEGATIVE / Urobili: NORMAL   Blood: SMALL / Protein: NEGATIVE / Nitrite: NEGATIVE   Leuk Esterase: SMALL / RBC: 6-10 / WBC 0-2   Sq Epi: FEW / Non Sq Epi: x / Bacteria: NEGATIVE      ABG - ( 2019 16:50 )  pH, Arterial: 7.32  pH, Blood: x     /  pCO2: 44    /  pO2: 137   / HCO3: 21    / Base Excess: -3.4  /  SaO2: 98.6                            11.4   7.70  )-----------( 352      ( 2019 08:20 )             35.9                         11.3   7.64  )-----------( 367      ( 2019 16:50 )             36.9                         10.0   6.56  )-----------( 376      ( 2019 05:00 )             31.0     CAPILLARY BLOOD GLUCOSE      POCT Blood Glucose.: 152 mg/dL (2019 21:47)  POCT Blood Glucose.: 139 mg/dL (2019 17:25)      RADIOLOGY & ADDITIONAL TESTS:    Imaging Personally Reviewed:  [x ] YES  [ ] NO    Consultants involved in case:   Consultant(s) Notes Reviewed:  [ x] YES  [ ] NO:   Care Discussed with Consultants/Other Providers [x ] YES  [ ] NO

## 2019-01-07 NOTE — PROGRESS NOTE ADULT - PROBLEM SELECTOR PLAN 3
recent stroke and evolving lesion seen on head CT with continued MS changes  MRI: subacute left parietal infarct, and few tiny scattered   acute infarcts in the bilateral centrum semiovale ovale.  resume diet with previous rec dysphagia type  -f/u PT/OT/ social work/ case management

## 2019-01-07 NOTE — PROGRESS NOTE ADULT - PROBLEM SELECTOR PLAN 5
with recent tylenol use for headaches and on statin unclear when initiated, may also be 2/2 HF and hepatic congestion  hold statin  f/u hepatitis panel   may c/w acetaminophen carefully for pain (maintain < 4g per day)

## 2019-01-07 NOTE — PROGRESS NOTE ADULT - PROBLEM SELECTOR PLAN 4
-Creatinine elevated, no hx of NSAID use; prior 1.13 in copies in medical chart  -avoid nephrotoxins  -renal dosing  -monitor CMP

## 2019-01-07 NOTE — PROGRESS NOTE ADULT - PROBLEM SELECTOR PLAN 6
VTE: ICDs  GoC: patient's daughter to speak with her sisters to clarify wishes, social work needed  Dispo: from rehab VTE: Heparin subcu q12h  GoC: patient's daughter to speak with her sisters to clarify wishes, social work needed  Dispo: rehab

## 2019-01-07 NOTE — PROGRESS NOTE ADULT - ATTENDING COMMENTS
Patient seen and examined with stroke fellow and above note reviewed and I agree with assessment and plan as outlined. Patient presenting with increased confusion and lethargy. Patient had a recent stroke in December involving the left parietal region.   Her MRI now reveals acute bilateral centrum semiovale infarcts in a watershed distribution.   Would rule out malignancy and potential risk of infarction secondary to that. Would also obtain TTE with bubble study and loop recorder.  Maintain blood pressure to avoid hypotension and continue ASA daily and medical management and supportive care.  All questions answered with son at bedside.

## 2019-01-07 NOTE — PROGRESS NOTE ADULT - SUBJECTIVE AND OBJECTIVE BOX
[facility] NEUROLOGY FOLLOW UP NOTE    HONEY MOREL  Female  MRN-[id number]    Subjective: no acute events overnight. daughter at bedside. Confirms, pt at new baseline since stroke in Dec 2018.  Pt awake, alert, moderate dysarthria (missing dentures), paraphasic errors, poor comprehension, able to follow basic commands.    VITAL SIGNS:  Vital Signs Last 24 Hrs  T(C): 36.7 (07 Jan 2019 05:11), Max: 36.7 (06 Jan 2019 19:42)  T(F): 98.1 (07 Jan 2019 05:11), Max: 98.1 (07 Jan 2019 05:11)  HR: 146 (07 Jan 2019 07:39) (92 - 146)  BP: 105/67 (07 Jan 2019 05:11) (100/63 - 115/80)  BP(mean): --  RR: 18 (07 Jan 2019 05:11) (16 - 18)  SpO2: 100% (07 Jan 2019 05:11) (99% - 100%)    PHYSICAL EXAMINATION:  General: frail lady, in no acute distress. Poorly cooperative for exam   Eyes: Conjunctiva and sclera clear.  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  - Motor:  Strength is 4/5 throughout.  There is no pronator drift.  poor muscle bulk and normal tone throughout.  - Reflexes:  1+ and symmetric at the biceps, triceps, brachioradialis, knees, and ankles.  Plantar responses mute.  - Sensory:  Intact to light touch throughout.  - Coordination:  unable to follow command  - Gait:   unable to follow command    LABS:                          11.4   7.70  )-----------( 352      ( 06 Jan 2019 08:20 )             35.9     01-06    141  |  104  |  14  ----------------------------<  96  3.9   |  28  |  0.52    Ca    7.9<L>      06 Jan 2019 18:30  Phos  3.4     01-06  Mg     2.1     01-06    TPro  8.0  /  Alb  3.6  /  TBili  0.6  /  DBili  x   /  AST  114<H>  /  ALT  148<H>  /  AlkPhos  473<H>  01-06    PT/INR - ( 06 Jan 2019 08:20 )   PT: 11.9 SEC;   INR: 1.07          PTT - ( 06 Jan 2019 08:20 )  PTT:26.3 SEC    RADIOLOGY & ADDITIONAL STUDIES:      < from: CT Angio Head w/ IV Cont (01.05.19 @ 09:42) >  IMPRESSION:     CT brain: There is ill-defined decreased attenuation within the left   parietal lobe concerning for evolving subacute infarction with   hyperintense attenuation along the gyral margins; petechial hemorrhagic   transformation is not excluded.    There is volume loss with white matter decreased attenuation, with remote   and age-indeterminate lacunar infarction. There is no large extra-axial   hemorrhage or midline shift.    Bony sclerosis and irregularity along the left greater wing of the   sphenoid. Multiple myeloma versus metastatic disease is not excluded.   Evaluation with PET/CT or nuclear medicine bone scan may be obtained as   clinically warranted.      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    CT angiography brain: Thrombosis of the right ICA within the cavernous,   and supraclinoid segment collateral flow provides circulation to the   right cerebral hemisphere.  Decreased size and number the distal left MCA   branches in the area of evolving subacute infarct in the left MCA   posterior distribution.      < end of copied text >    < from: MR Head No Cont (01.06.19 @ 13:09) >    IMPRESSION: Age-appropriate involutional and ischemic gliotic changes.   Subacute left parietal infarct with mineralization. A few tiny scattered   acute infarcts in the bilateral centrum semiovale ovale.    < end of copied text > [facility] NEUROLOGY FOLLOW UP NOTE    HONEY MOREL  Female  MRN-[id number]    Subjective: no acute events overnight. daughter at bedside. Confirms, pt at new baseline since stroke in Dec 2018.      VITAL SIGNS:  Vital Signs Last 24 Hrs  T(C): 36.7 (07 Jan 2019 05:11), Max: 36.7 (06 Jan 2019 19:42)  T(F): 98.1 (07 Jan 2019 05:11), Max: 98.1 (07 Jan 2019 05:11)  HR: 146 (07 Jan 2019 07:39) (92 - 146)  BP: 105/67 (07 Jan 2019 05:11) (100/63 - 115/80)  BP(mean): --  RR: 18 (07 Jan 2019 05:11) (16 - 18)  SpO2: 100% (07 Jan 2019 05:11) (99% - 100%)    PHYSICAL EXAMINATION:  General: frail lady, in no acute distress. Poorly cooperative for exam   Eyes: Conjunctiva and sclera clear.  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      LABS:                          11.4   7.70  )-----------( 352      ( 06 Jan 2019 08:20 )             35.9     01-06    141  |  104  |  14  ----------------------------<  96  3.9   |  28  |  0.52    Ca    7.9<L>      06 Jan 2019 18:30  Phos  3.4     01-06  Mg     2.1     01-06    TPro  8.0  /  Alb  3.6  /  TBili  0.6  /  DBili  x   /  AST  114<H>  /  ALT  148<H>  /  AlkPhos  473<H>  01-06    PT/INR - ( 06 Jan 2019 08:20 )   PT: 11.9 SEC;   INR: 1.07          PTT - ( 06 Jan 2019 08:20 )  PTT:26.3 SEC    RADIOLOGY & ADDITIONAL STUDIES:      < from: CT Angio Head w/ IV Cont (01.05.19 @ 09:42) >  IMPRESSION:     CT brain: There is ill-defined decreased attenuation within the left   parietal lobe concerning for evolving subacute infarction with   hyperintense attenuation along the gyral margins; petechial hemorrhagic   transformation is not excluded.    There is volume loss with white matter decreased attenuation, with remote   and age-indeterminate lacunar infarction. There is no large extra-axial   hemorrhage or midline shift.    Bony sclerosis and irregularity along the left greater wing of the   sphenoid. Multiple myeloma versus metastatic disease is not excluded.   Evaluation with PET/CT or nuclear medicine bone scan may be obtained as   clinically warranted.      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    CT angiography brain: Thrombosis of the right ICA within the cavernous,   and supraclinoid segment collateral flow provides circulation to the   right cerebral hemisphere.  Decreased size and number the distal left MCA   branches in the area of evolving subacute infarct in the left MCA   posterior distribution.      < end of copied text >    < from: MR Head No Cont (01.06.19 @ 13:09) >    IMPRESSION: Age-appropriate involutional and ischemic gliotic changes.   Subacute left parietal infarct with mineralization. A few tiny scattered   acute infarcts in the bilateral centrum semiovale ovale.    < end of copied text >

## 2019-01-07 NOTE — PROGRESS NOTE ADULT - ATTENDING COMMENTS
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 Course c/b acute on chronic HF exacerbation.     #Encephalopathy - Likely related to recent CVA. Neuro recs appreciated. MRI done today showing age-appropriate involutional and ischemic gliotic changes; Subacute left parietal infarct with mineralization; a few tiny scattered acute infarcts in the bilateral centrum semiovale ovale. C/w ASA, statin on hold 2/2 elevated LFTs.  Pending EEG and TTE.      #Acute on chronic HF - c/w lasix as tolerated     #Transaminitis - unclear etiology (new statin use vs. congestive hepatopathy), will continue to trend. Hold statin at this time. Abd US w/ small ascites and no cholelithiasis.     #Dispo - PT/OT=J LUIS  Attempted to discuss GOC with son present states there are  other siblings, states his goals are for her to improve and eventually go back home, doesn't feel she is strong enough to go home, would be OK with rehab.  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

## 2019-01-07 NOTE — OCCUPATIONAL THERAPY INITIAL EVALUATION ADULT - GENERAL OBSERVATIONS, REHAB EVAL
Pt. received semisupine in bed. No acute distress. +IV, +Urethral Catheter. Daughter present bedside.

## 2019-01-07 NOTE — OCCUPATIONAL THERAPY INITIAL EVALUATION ADULT - MD ORDER
Occupational Therapy (OT) to evaluate and treat. Occupational Therapy (OT) to evaluate and treat. Per LORENZO CROWLEY, pt is okay to participate in OT evaluation and perform activity as tolerated.

## 2019-01-07 NOTE — PROGRESS NOTE ADULT - ASSESSMENT
85 year old RH AA female with PMHx of L MCA stroke in dec 2018 with right sided deficits, Breast Ca, CHF who was at [facility] for PT/rehab. Since the stroke the patient has been unable to do her ADLs, and has had worsened mentation and memory.     MRI showing new tiny acute infarcts in bilateral centrum semiovale in addition to age-appropriate evolutional changes of subacute left parietal infarct.   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     [x] MRI/CTA done  [] obtain TTE w/ bubble study  +/- Loop recorder after  [x] c/w ASA 81 for now  [] Statin held for transaminitis  [] would check ammonia level, A1c, Lipid panel, B12, folate  [] consider IV thiamine replacement 500mg x2 days  [] agree with checking routine EEG to assess for epileptogenic focus  [] PT/OT eval  [] Avoid hypotension as it would exacerbate her stroke symptoms and may cause confusion  - rest of care as per primary team 85 year old RH AA female with PMHx of L MCA stroke in dec 2018 with right sided deficits, Breast Ca, CHF who was at [facility] for PT/rehab. Since the stroke the patient has been unable to do her ADLs, and has had worsened mentation and memory.     MRI showing new tiny acute infarcts in bilateral centrum semiovale in addition to age-appropriate evolutional changes of subacute left parietal infarct.   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 Would continue with ASA at this time.  New acute infarcts appear to be in a watershed distribution - would screen for occult malginancy with CT Chest/Abdomen/Pelvis  -Avoid any further hypotension and would allow for permissive hypertension  TTE and if unremarkable - patient would benefit from long term cardiac monitoring  A1c/LDL  PT/OT 85 year old RH AA female with PMHx of L MCA stroke in dec 2018 with right sided deficits, Breast Ca, CHF who was at [facility] for PT/rehab. Since the stroke the patient has been unable to do her ADLs, and has had worsened mentation and memory.     MRI showing new tiny acute infarcts in bilateral centrum semiovale in addition to age-appropriate evolutional changes of subacute left parietal infarct.   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 Would continue with ASA at this time.  New acute infarcts appear to be in a watershed distribution - would screen for occult malginancy with CT Chest/Abdomen/Pelvis  -Avoid any further hypotension and would allow for permissive hypertension  TTE and if unremarkable - patient would benefit from long term cardiac monitoring  A1c/LDL  PT/OT

## 2019-01-07 NOTE — PROGRESS NOTE ADULT - PROBLEM SELECTOR PLAN 1
-likely 2/2 residual deficits from CVA December 2018  -differential included infectious vs. new CVA, UA negative, MRI with old (subacute) left parietal infarct  -collateral information from family suggests patient is at new baseline post CVA  -for patient complaints of pain in setting of AMS s/p CVA  -tylenol therapy (325 bid), and prn 325 [id number] with MDD for this patient as 2G  -difficult to assess patient pain due to mentation

## 2019-01-07 NOTE — PROGRESS NOTE ADULT - ASSESSMENT
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 Patient's mental status AAOx1, back to her new baseline, as discussed with family (daughter Carla Hines), MRI revealed subacute infarct as described. Pending social work intervention. 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 Patient's mental status AAOx1, back to her new baseline, as discussed with family (daughter Carla Hines), MRI revealed subacute infarct as described.

## 2019-01-07 NOTE — OCCUPATIONAL THERAPY INITIAL EVALUATION ADULT - DIAGNOSIS, OT EVAL
Tiny scattered acute infarcts in the bilateral centrum semiovale ovale; Decreased ability to follow commands; Decreased functional mobility; Decreased ADL management

## 2019-01-07 NOTE — PROGRESS NOTE ADULT - NSHPATTENDINGPLANDISCUSS_GEN_ALL_CORE
patient and son and stroke fellow and primary team.
Team 1 intern/resident; patient's daughter at bedside

## 2019-01-07 NOTE — OCCUPATIONAL THERAPY INITIAL EVALUATION ADULT - LIVES WITH, PROFILE
Pt.'s daughter bedside explains pt. presented to hospital from [facility] for Rehab where pt has been since recent CVA. Prior to initial hospitalization, pt.'s daughter explains pt lives with her in a house with steps present to negotiate.

## 2019-01-07 NOTE — OCCUPATIONAL THERAPY INITIAL EVALUATION ADULT - PERTINENT HX OF CURRENT PROBLEM, REHAB EVAL
Pt is an 85 year old Female with PMHx of CHF, breast cancer ~9 years ago with Right breast removal, and CVA on 12/15/18 who presented from Rehab to [facility] on 1/5/19 with approximately 10 days of intermittent confusion, agitation, and headache. (See continued below)

## 2019-01-07 NOTE — PROGRESS NOTE ADULT - PROBLEM SELECTOR PLAN 2
-EF=55% on outside documentation  -mild interstitial edema on cxr, patient now clinically improved, dry appearing, no LE edema  -elevated troponins in setting of demand ischemia/hypoxia overload  -ctm  -I/Os, murray in place  -c/w lasix 20 po daily and metoprolol 25 (split into 12.5 bid while inpatient)

## 2019-01-08 DIAGNOSIS — Z71.89 OTHER SPECIFIED COUNSELING: ICD-10-CM

## 2019-01-08 LAB
ALBUMIN SERPL ELPH-MCNC: 3.1 G/DL — LOW (ref 3.3–5)
ALP SERPL-CCNC: 383 U/L — HIGH (ref 40–120)
ALT FLD-CCNC: 94 U/L — HIGH (ref 4–33)
AST SERPL-CCNC: 72 U/L — HIGH (ref 4–32)
BASOPHILS # BLD AUTO: 0.09 K/UL — SIGNIFICANT CHANGE UP (ref 0–0.2)
BASOPHILS NFR BLD AUTO: 1.7 % — SIGNIFICANT CHANGE UP (ref 0–2)
BILIRUB SERPL-MCNC: 0.4 MG/DL — SIGNIFICANT CHANGE UP (ref 0.2–1.2)
BUN SERPL-MCNC: 20 MG/DL — SIGNIFICANT CHANGE UP (ref 7–23)
CALCIUM SERPL-MCNC: 9.1 MG/DL — SIGNIFICANT CHANGE UP (ref 8.4–10.5)
CHLORIDE SERPL-SCNC: 100 MMOL/L — SIGNIFICANT CHANGE UP (ref 98–107)
CHOLEST SERPL-MCNC: 167 MG/DL — SIGNIFICANT CHANGE UP (ref 120–199)
CO2 SERPL-SCNC: 27 MMOL/L — SIGNIFICANT CHANGE UP (ref 22–31)
CREAT SERPL-MCNC: 1.4 MG/DL — HIGH (ref 0.5–1.3)
EOSINOPHIL # BLD AUTO: 0.41 K/UL — SIGNIFICANT CHANGE UP (ref 0–0.5)
EOSINOPHIL NFR BLD AUTO: 7.6 % — HIGH (ref 0–6)
GLUCOSE SERPL-MCNC: 90 MG/DL — SIGNIFICANT CHANGE UP (ref 70–99)
HAV IGM SER-ACNC: NONREACTIVE — SIGNIFICANT CHANGE UP
HBA1C BLD-MCNC: 6.6 % — HIGH (ref 4–5.6)
HBV CORE AB SER-ACNC: NONREACTIVE — SIGNIFICANT CHANGE UP
HBV CORE IGM SER-ACNC: NONREACTIVE — SIGNIFICANT CHANGE UP
HBV SURFACE AB SER-ACNC: NONREACTIVE — SIGNIFICANT CHANGE UP
HBV SURFACE AG SER-ACNC: NONREACTIVE — SIGNIFICANT CHANGE UP
HCT VFR BLD CALC: 32 % — LOW (ref 34.5–45)
HCV AB S/CO SERPL IA: 0.1 S/CO — SIGNIFICANT CHANGE UP
HCV AB SERPL-IMP: SIGNIFICANT CHANGE UP
HDLC SERPL-MCNC: 66 MG/DL — HIGH (ref 45–65)
HGB BLD-MCNC: 10 G/DL — LOW (ref 11.5–15.5)
IMM GRANULOCYTES NFR BLD AUTO: 0.4 % — SIGNIFICANT CHANGE UP (ref 0–1.5)
LACTATE SERPL-SCNC: 1.2 MMOL/L — SIGNIFICANT CHANGE UP (ref 0.5–2)
LDH SERPL L TO P-CCNC: 327 U/L — HIGH (ref 135–225)
LIPID PNL WITH DIRECT LDL SERPL: 95 MG/DL — SIGNIFICANT CHANGE UP
LYMPHOCYTES # BLD AUTO: 1.4 K/UL — SIGNIFICANT CHANGE UP (ref 1–3.3)
LYMPHOCYTES # BLD AUTO: 25.9 % — SIGNIFICANT CHANGE UP (ref 13–44)
MAGNESIUM SERPL-MCNC: 2 MG/DL — SIGNIFICANT CHANGE UP (ref 1.6–2.6)
MCHC RBC-ENTMCNC: 25.8 PG — LOW (ref 27–34)
MCHC RBC-ENTMCNC: 31.3 % — LOW (ref 32–36)
MCV RBC AUTO: 82.7 FL — SIGNIFICANT CHANGE UP (ref 80–100)
MONOCYTES # BLD AUTO: 0.55 K/UL — SIGNIFICANT CHANGE UP (ref 0–0.9)
MONOCYTES NFR BLD AUTO: 10.2 % — SIGNIFICANT CHANGE UP (ref 2–14)
NEUTROPHILS # BLD AUTO: 2.93 K/UL — SIGNIFICANT CHANGE UP (ref 1.8–7.4)
NEUTROPHILS NFR BLD AUTO: 54.2 % — SIGNIFICANT CHANGE UP (ref 43–77)
NRBC # FLD: 0 K/UL — LOW (ref 25–125)
NT-PROBNP SERPL-SCNC: 6501 PG/ML — SIGNIFICANT CHANGE UP
PHOSPHATE SERPL-MCNC: 3.5 MG/DL — SIGNIFICANT CHANGE UP (ref 2.5–4.5)
PLATELET # BLD AUTO: 336 K/UL — SIGNIFICANT CHANGE UP (ref 150–400)
PMV BLD: 10.8 FL — SIGNIFICANT CHANGE UP (ref 7–13)
POTASSIUM SERPL-MCNC: 4.6 MMOL/L — SIGNIFICANT CHANGE UP (ref 3.5–5.3)
POTASSIUM SERPL-SCNC: 4.6 MMOL/L — SIGNIFICANT CHANGE UP (ref 3.5–5.3)
PROT SERPL-MCNC: 7 G/DL — SIGNIFICANT CHANGE UP (ref 6–8.3)
RBC # BLD: 3.87 M/UL — SIGNIFICANT CHANGE UP (ref 3.8–5.2)
RBC # FLD: 15.6 % — HIGH (ref 10.3–14.5)
SODIUM SERPL-SCNC: 137 MMOL/L — SIGNIFICANT CHANGE UP (ref 135–145)
TRIGL SERPL-MCNC: 67 MG/DL — SIGNIFICANT CHANGE UP (ref 10–149)
WBC # BLD: 5.4 K/UL — SIGNIFICANT CHANGE UP (ref 3.8–10.5)
WBC # FLD AUTO: 5.4 K/UL — SIGNIFICANT CHANGE UP (ref 3.8–10.5)

## 2019-01-08 PROCEDURE — 99233 SBSQ HOSP IP/OBS HIGH 50: CPT | Mod: GC

## 2019-01-08 PROCEDURE — 95819 EEG AWAKE AND ASLEEP: CPT | Mod: 26

## 2019-01-08 RX ADMIN — Medication 325 MILLIGRAM(S): at 17:12

## 2019-01-08 RX ADMIN — HEPARIN SODIUM 5000 UNIT(S): 5000 INJECTION INTRAVENOUS; SUBCUTANEOUS at 05:18

## 2019-01-08 RX ADMIN — BRIMONIDINE TARTRATE 1 DROP(S): 2 SOLUTION/ DROPS OPHTHALMIC at 13:02

## 2019-01-08 RX ADMIN — Medication 325 MILLIGRAM(S): at 06:19

## 2019-01-08 RX ADMIN — Medication 12.5 MILLIGRAM(S): at 05:18

## 2019-01-08 RX ADMIN — Medication 325 MILLIGRAM(S): at 05:19

## 2019-01-08 RX ADMIN — DORZOLAMIDE HYDROCHLORIDE 1 DROP(S): 20 SOLUTION/ DROPS OPHTHALMIC at 05:19

## 2019-01-08 RX ADMIN — HEPARIN SODIUM 5000 UNIT(S): 5000 INJECTION INTRAVENOUS; SUBCUTANEOUS at 17:13

## 2019-01-08 RX ADMIN — Medication 81 MILLIGRAM(S): at 17:12

## 2019-01-08 RX ADMIN — Medication 12.5 MILLIGRAM(S): at 17:13

## 2019-01-08 RX ADMIN — DORZOLAMIDE HYDROCHLORIDE 1 DROP(S): 20 SOLUTION/ DROPS OPHTHALMIC at 22:24

## 2019-01-08 RX ADMIN — Medication 105 MILLIGRAM(S): at 13:02

## 2019-01-08 RX ADMIN — Medication 325 MILLIGRAM(S): at 23:47

## 2019-01-08 RX ADMIN — Medication 20 MILLIGRAM(S): at 05:18

## 2019-01-08 RX ADMIN — Medication 30 MILLILITER(S): at 17:13

## 2019-01-08 RX ADMIN — Medication 105 MILLIGRAM(S): at 06:00

## 2019-01-08 RX ADMIN — Medication 105 MILLIGRAM(S): at 22:24

## 2019-01-08 RX ADMIN — DORZOLAMIDE HYDROCHLORIDE 1 DROP(S): 20 SOLUTION/ DROPS OPHTHALMIC at 13:02

## 2019-01-08 RX ADMIN — Medication 1 DROP(S): at 13:02

## 2019-01-08 NOTE — PROGRESS NOTE ADULT - ASSESSMENT
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 Patient's mental status AAOx0-1, at what is likely her new baseline, as discussed with family (daughter Charis Monroy), MRI revealed subacute infarct as described as well as new watershed infarcts in the b/l centrum semiovale ovale.

## 2019-01-08 NOTE — PROGRESS NOTE ADULT - PROBLEM SELECTOR PLAN 6
VTE: Heparin subcu q12h  GoC: patient's daughter to speak with her sisters to clarify wishes, GOC  Dispo: rehab

## 2019-01-08 NOTE — PROGRESS NOTE ADULT - PROBLEM SELECTOR PLAN 3
recent stroke and evolving lesion seen on head CT with continued MS changes  MRI: subacute left parietal infarct, and few tiny scattered   acute infarcts in the bilateral centrum semiovale ovale.  resume diet with previous rec dysphagia type  -PT/OT recommending rehab, social work/ case management on board

## 2019-01-08 NOTE — PROGRESS NOTE ADULT - PROBLEM SELECTOR PLAN 1
-likely 2/2 residual deficits from CVA December 2018 and acute infarcts in the b/l centrum semiovale ovale.  -differential included infectious vs. new CVA, UA negative, MRI with old (subacute) left parietal infarct, acute watershed like infarcts in b/l centrum semiovale ovale  -collateral information from family suggests patient is at new baseline post CVA  -neurology evaluated, suggest c/w ASA, permissive HTN and consider CTCAP and TTE to evaluate possible underlying malignancy/other pathology

## 2019-01-08 NOTE — PROGRESS NOTE ADULT - ATTENDING COMMENTS
# CVA: CTA with R ICA stenosis with collateral, MRI with Subacute left parietal infarct with mineralization. A few tiny scattered acute infarcts in the bilateral centrum semiovale ovale.  -tele: no events  -EEG neg  -TTE pending  -overall multiple CVA, MS is better and now at baseline after first stroke but still not good  -c/w BP control  -will need to d/w family overall GOC as neuro rec ILP and malignancy w/u however given her fxnal status and cognitive status even if this w/u was positive would be poor candidate for treatement    #Encephalopathy: likely 2/2 CVA with new CVA on MRI  -now back at prior baseline  -no evidence of infection    #Infiltrative Cardiac disease/Systolic HF: MRI suggestive of cardiac amyloidosis, depending on type prognosis very poor for AL (~6 months) or ATTR   -check serum EP, immunofixation, and light chains    Attempting to coordinate family meeting prior to dispo  Will need J [facility]IS on dispo

## 2019-01-08 NOTE — SWALLOW BEDSIDE ASSESSMENT ADULT - SWALLOW EVAL: DIAGNOSIS
Patient with limited PO acceptance despite encouragement from SLP/Family Members.  Patient accepted PO trials x2 for vanilla pudding (dislikes applesauce) and thin liquids via cup sips self administer characterized by adequate oral containment, adequate bolus manipulation and transport. There is laryngeal elevation upon palpation, initiation of the pharyngeal swallow. There were no overt signs of impaired airway protection. Patient with limited PO acceptance despite encouragement from SLP/Family Members.  Patient accepted PO trials x2 for vanilla pudding (dislikes applesaucex1 and expectorated applesauce) and thin liquids via cup sips self administer characterized by adequate oral containment, adequate bolus manipulation and transport. There is laryngeal elevation upon palpation, initiation of the pharyngeal swallow. There were no overt signs of impaired airway protection.

## 2019-01-08 NOTE — EEG REPORT - NS EEG TEXT BOX
[facility]   COMPREHENSIVE EPILEPSY CENTER   REPORT OF ROUTINE VIDEO EEG     [facility]: [address], [place] [address], Ph#: [phone]  [facility]: [address], [place] [address], Ph#: [phone]  Office: [address] [place] [address], [address], [place] [address] Ph#: [phone]    Patient Name: HONEY MOREL  Age and : 85y ([birth year])  MRN #: [id number]  Location: [address]  Referring Physician: Tricia Apodaca    Study Date: 19    _____________________________________________________________  TECHNICAL INFORMATION    Placement and Labeling of Electrodes:  The EEG was performed utilizing 20 channels referential EEG connections (coronal over temporal over parasagittal montage) using all standard 10-20 electrode placements with EKG.  Recording was at a sampling rate of 256 samples per second per channel.  Time synchronized digital video recording was done simultaneously with EEG recording.  A low light infrared camera was used for low light recording.  Aaron and seizure detection algorithms were utilized.    _____________________________________________________________  HISTORY    Patient is a 85y old  Female who presents with a chief complaint of encephalopathy (2019 08:47)    PERTINENT MEDICATION:  No AEDs  _____________________________________________________________  STUDY INTERPRETATION    Findings: The background was continuous, spontaneously variable and reactive. During wakefulness, the posterior dominant rhythm consisted of symmetric, well-modulated 7-8 Hz activity, with amplitude to 30 uV, that attenuated to eye opening.  Low amplitude frontal beta was noted in wakefulness.    Background Slowing:  Background predominantly consisted of theta, delta and faster activities.    Focal Slowing:   Continuous polymorphic delta and theta activities over the left hemisphere.     Sleep Background:  Stage II sleep transients were not recorded.    Other Non-Epileptiform Findings:  None were present.    Interictal Epileptiform Activity:   None were present.    Events:  Clinical events: None recorded.  Seizures: None recorded.    Activation Procedures:   Hyperventilation was not performed.    Photic stimulation was not performed.     Artifacts:  Intermittent myogenic and movement artifacts were noted.    ECG:  The heart rate on single channel ECG was predominantly between 80-90 BPM.  _____________________________________________________________  EEG SUMMARY/CLASSIFICATION  Abnormal EEG in the awake state.  - Continuous polymorphic delta and theta activities over the left hemisphere.   - Mild to moderate generalized slowing.  _____________________________________________________________  EEG IMPRESSION/CLINICAL CORRELATE  Abnormal EEG study.  1. Structural abnormality in the left hemisphere.  2. Mild to moderate nonspecific diffuse or multifocal cerebral dysfunction.   3. No epileptiform pattern or seizure seen.      Preliminary Fellow Read:      Rosa Chaudhry MD  Adult EEG Fellow, [facility] Epilepsy [place] [facility]   COMPREHENSIVE EPILEPSY CENTER   REPORT OF ROUTINE VIDEO EEG     [facility]: [address], [place] [address], Ph#: [phone]  [facility]: [address], [place] [address], Ph#: [phone]  Office: [address] [place] [address], [address], [place] [address] Ph#: [phone]    Patient Name: HONEY MOREL  Age and : 85y ([birth year])  MRN #: [id number]  Location: [address]  Referring Physician: Tricia Apodaca    Study Date: 19    _____________________________________________________________  TECHNICAL INFORMATION    Placement and Labeling of Electrodes:  The EEG was performed utilizing 20 channels referential EEG connections (coronal over temporal over parasagittal montage) using all standard 10-20 electrode placements with EKG.  Recording was at a sampling rate of 256 samples per second per channel.  Time synchronized digital video recording was done simultaneously with EEG recording.  A low light infrared camera was used for low light recording.  Aaron and seizure detection algorithms were utilized.    _____________________________________________________________  HISTORY    Patient is a 85y old  Female who presents with a chief complaint of encephalopathy (2019 08:47)    PERTINENT MEDICATION:  No AEDs  _____________________________________________________________  STUDY INTERPRETATION    Findings: The background was continuous, spontaneously variable and reactive. During wakefulness, the posterior dominant rhythm consisted of symmetric, well-modulated 7-8 Hz activity, with amplitude to 30 uV, that attenuated to eye opening.  Low amplitude frontal beta was noted in wakefulness.    Background Slowing:  Background predominantly consisted of theta, delta and faster activities.    Focal Slowing:   Continuous polymorphic delta and theta activities over the left hemisphere.     Sleep Background:  Stage II sleep transients were not recorded.    Other Non-Epileptiform Findings:  None were present.    Interictal Epileptiform Activity:   None were present.    Events:  Clinical events: None recorded.  Seizures: None recorded.    Activation Procedures:   Hyperventilation was not performed.    Photic stimulation was not performed.     Artifacts:  Intermittent myogenic and movement artifacts were noted.    ECG:  The heart rate on single channel ECG was predominantly between 80-90 BPM.  _____________________________________________________________  EEG SUMMARY/CLASSIFICATION  Abnormal EEG in the awake state.  - Continuous polymorphic delta and theta activities over the left hemisphere.   - Mild to moderate generalized slowing.  _____________________________________________________________  EEG IMPRESSION/CLINICAL CORRELATE  Abnormal EEG study.  1. Structural abnormality in the left hemisphere.  2. Mild to moderate nonspecific diffuse or multifocal cerebral dysfunction.   3. No epileptiform pattern or seizure seen.      Rosa Chaudhry MD  Adult EEG Fellow, [facility] Epilepsy [place]

## 2019-01-08 NOTE — PROGRESS NOTE ADULT - SUBJECTIVE AND OBJECTIVE BOX
***************************************************************  Sharmila Omergiovanna, PGY1  Internal Medicine   pager [phone]  ***************************************************************    HONEY MOREL  85y  MRN: [id number]    Patient is a 85y old woman admitted for encephalopathy.     Subjective: Patient was seen and examined at the bedside. There were no acute events overnight. She is AAO x0. Daughter Charis was at the bedside. Offered [facility] meeting for her and other family members. Daughter stated she would let the team know when they would be able to talk as a group.    MEDICATIONS  (STANDING):  acetaminophen   Tablet .. 325 milliGRAM(s) Oral two times a day  aspirin enteric coated 81 milliGRAM(s) Oral daily  brimonidine 0.2% Ophthalmic Solution 1 Drop(s) Left EYE daily  dorzolamide 2% Ophthalmic Solution 1 Drop(s) Left EYE three times a day  furosemide    Tablet 20 milliGRAM(s) Oral daily  heparin  Injectable 5000 Unit(s) SubCutaneous every 12 hours  metoprolol tartrate 12.5 milliGRAM(s) Oral two times a day  thiamine IVPB 500 milliGRAM(s) IV Intermittent every 8 hours  timolol 0.5% Solution 1 Drop(s) Left EYE daily    MEDICATIONS  (PRN):  acetaminophen   Tablet .. 325 milliGRAM(s) Oral every 6 hours PRN Mild Pain (1 - 3), Moderate Pain (4 - 6), Severe Pain (7 - 10)      Objective:    Vitals: Vital Signs Last 24 Hrs  T(C): 36.8 (01-08-19 @ 05:14), Max: 36.8 (01-08-19 @ 05:14)  T(F): 98.2 (01-08-19 @ 05:14), Max: 98.2 (01-08-19 @ 05:14)  HR: 88 (01-08-19 @ 05:14) (88 - 96)  BP: 119/78 (01-08-19 @ 05:14) (92/56 - 119/78)  BP(mean): --  RR: 16 (01-08-19 @ 05:14) (16 - 16)  SpO2: 100% (01-08-19 @ 05:14) (100% - 100%)            I&O's Summary    07 Jan 2019 07:01  -  08 Jan 2019 07:00  --------------------------------------------------------  IN: 0 mL / OUT: 1000 mL / NET: -1000 mL    08 Jan 2019 07:01  -  08 Jan 2019 08:48  --------------------------------------------------------  IN: 0 mL / OUT: 200 mL / NET: -200 mL        PHYSICAL EXAM:  GENERAL: Elderly woman, minimally responsive to questions, occasionally follows commands  HEAD:  Poor dentition  EYES: Conjunctiva and sclera clear  CHEST/LUNG: Clear to auscultation bilaterally; No rales, rhonchi, wheezing, or rubs  HEART: Regular rate and rhythm; No murmurs, rubs, or gallops.  ABDOMEN: Soft, Nontender, Nondistended, BS+   SKIN: Dry appearing  NERVOUS SYSTEM:  Alert & Oriented X0, unable to further assess due to patient mental status    LABS:  01-08    137  |  100  |  20  ----------------------------<  90  4.6   |  27  |  1.40<H>  01-06    141  |  104  |  14  ----------------------------<  96  3.9   |  28  |  0.52  01-06    135  |  96<L>  |  17  ----------------------------<  85  4.9   |  25  |  1.47<H>    Ca    9.1      08 Jan 2019 05:45  Ca    7.9<L>      06 Jan 2019 18:30  Ca    9.9      06 Jan 2019 08:20  Phos  3.5     01-08  Mg     2.0     01-08    TPro  7.0  /  Alb  3.1<L>  /  TBili  0.4  /  DBili  x   /  AST  72<H>  /  ALT  94<H>  /  AlkPhos  383<H>  01-08  TPro  8.0  /  Alb  3.6  /  TBili  0.6  /  DBili  x   /  AST  114<H>  /  ALT  148<H>  /  AlkPhos  473<H>  01-06  TPro  7.8  /  Alb  3.6  /  TBili  0.5  /  DBili  x   /  AST  131<H>  /  ALT  157<H>  /  AlkPhos  486<H>  01-05                                              10.0   5.40  )-----------( 336      ( 08 Jan 2019 05:45 )             32.0                         11.4   7.70  )-----------( 352      ( 06 Jan 2019 08:20 )             35.9                         11.3   7.64  )-----------( 367      ( 05 Jan 2019 16:50 )             36.9     CAPILLARY BLOOD GLUCOSE          RADIOLOGY & ADDITIONAL TESTS:    Imaging Personally Reviewed:  [x ] YES  [ ] NO    Consultants involved in case:   Consultant(s) Notes Reviewed:  [ x] YES  [ ] NO:   Care Discussed with Consultants/Other Providers [x ] YES  [ ] NO

## 2019-01-08 NOTE — PROGRESS NOTE ADULT - PROBLEM SELECTOR PLAN 2
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

## 2019-01-08 NOTE — SWALLOW BEDSIDE ASSESSMENT ADULT - ADDITIONAL RECOMMENDATIONS
Monitor PO tolerance/intake. Monitor for any evolving neurological/mental status change that may impact on oral intake. Reconsult PRN.

## 2019-01-08 NOTE — SWALLOW BEDSIDE ASSESSMENT ADULT - COMMENTS
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 Patient's mental status AAOx0-1, at what is likely her new baseline, as discussed with family (daughter Charis Monroy), MRI revealed subacute infarct as described as well as new watershed infarcts in the b/l centrum semiovale ovale.    Patient seen at bedside. Family is present (daughters and granddaughter). Patient's baseline diet at home is mechanical soft with thin liquids. Patient is awaken to alert eyes open state by family member encouragement to participate for clinical swallow eval.  Patient is able to follow simple commands; limited verbal output.      Nurse reports that patient tolerated and ate well the PO breakfast meal this morning without difficulty observed.

## 2019-01-08 NOTE — SWALLOW BEDSIDE ASSESSMENT ADULT - MODE OF PRESENTATION
Kaylyn states a new prescription for test strips needs to be sent to pharmacy with an attending's name and signature. Please advise     [facility] [address] [place] [id number]  Phone: [phone] Fax: [phone]     spoon/fed by clinician cup/self fed

## 2019-01-09 DIAGNOSIS — Z51.5 ENCOUNTER FOR PALLIATIVE CARE: ICD-10-CM

## 2019-01-09 DIAGNOSIS — R52 PAIN, UNSPECIFIED: ICD-10-CM

## 2019-01-09 DIAGNOSIS — R53.81 OTHER MALAISE: ICD-10-CM

## 2019-01-09 LAB
ALBUMIN SERPL ELPH-MCNC: 2.9 G/DL — LOW (ref 3.3–5)
ALP SERPL-CCNC: 286 U/L — HIGH (ref 40–120)
ALT FLD-CCNC: SIGNIFICANT CHANGE UP U/L (ref 4–33)
ANION GAP SERPL CALC-SCNC: 3 MEQ/L — LOW (ref 7–14)
AST SERPL-CCNC: SIGNIFICANT CHANGE UP U/L (ref 4–32)
BILIRUB SERPL-MCNC: 0.6 MG/DL — SIGNIFICANT CHANGE UP (ref 0.2–1.2)
BUN SERPL-MCNC: 18 MG/DL — SIGNIFICANT CHANGE UP (ref 7–23)
CALCIUM SERPL-MCNC: 8.7 MG/DL — SIGNIFICANT CHANGE UP (ref 8.4–10.5)
CHLORIDE SERPL-SCNC: 99 MMOL/L — SIGNIFICANT CHANGE UP (ref 98–107)
CO2 SERPL-SCNC: 27 MMOL/L — SIGNIFICANT CHANGE UP (ref 22–31)
CREAT SERPL-MCNC: 1.04 MG/DL — SIGNIFICANT CHANGE UP (ref 0.5–1.3)
GLUCOSE SERPL-MCNC: 83 MG/DL — SIGNIFICANT CHANGE UP (ref 70–99)
HCV RNA SERPL NAA DL=5-ACNC: NOT DETECTED IU/ML — SIGNIFICANT CHANGE UP
HCV RNA SPEC NAA+PROBE-LOG IU: SIGNIFICANT CHANGE UP LOGIU/ML
KAPPA FREE LIGHT CHAINS, SERUM: 3.04 MG/DL — HIGH (ref 0.33–1.94)
LAMBDA FREE LIGHT CHAINS, SERUM: 1.91 MG/DL — SIGNIFICANT CHANGE UP (ref 0.57–2.63)
MAGNESIUM SERPL-MCNC: 2.1 MG/DL — SIGNIFICANT CHANGE UP (ref 1.6–2.6)
PHOSPHATE SERPL-MCNC: SIGNIFICANT CHANGE UP MG/DL (ref 2.5–4.5)
POTASSIUM SERPL-MCNC: SIGNIFICANT CHANGE UP MMOL/L (ref 3.5–5.3)
POTASSIUM SERPL-SCNC: SIGNIFICANT CHANGE UP MMOL/L (ref 3.5–5.3)
PROT SERPL-MCNC: SIGNIFICANT CHANGE UP G/DL (ref 6–8.3)
SODIUM SERPL-SCNC: 129 MMOL/L — LOW (ref 135–145)

## 2019-01-09 PROCEDURE — 99223 1ST HOSP IP/OBS HIGH 75: CPT | Mod: GC

## 2019-01-09 PROCEDURE — 99233 SBSQ HOSP IP/OBS HIGH 50: CPT | Mod: GC

## 2019-01-09 PROCEDURE — 86334 IMMUNOFIX E-PHORESIS SERUM: CPT | Mod: 26

## 2019-01-09 PROCEDURE — 86335 IMMUNFIX E-PHORSIS/URINE/CSF: CPT | Mod: 26

## 2019-01-09 PROCEDURE — 84165 PROTEIN E-PHORESIS SERUM: CPT | Mod: 26

## 2019-01-09 RX ORDER — HYDROMORPHONE HYDROCHLORIDE 2 MG/ML
0.5 INJECTION INTRAMUSCULAR; INTRAVENOUS; SUBCUTANEOUS
Qty: 0 | Refills: 0 | Status: DISCONTINUED | OUTPATIENT
Start: 2019-01-09 | End: 2019-01-10

## 2019-01-09 RX ORDER — ACETAMINOPHEN 500 MG
325 TABLET ORAL ONCE
Qty: 0 | Refills: 0 | Status: COMPLETED | OUTPATIENT
Start: 2019-01-09 | End: 2019-01-09

## 2019-01-09 RX ORDER — IBUPROFEN 200 MG
600 TABLET ORAL ONCE
Qty: 0 | Refills: 0 | Status: COMPLETED | OUTPATIENT
Start: 2019-01-09 | End: 2019-01-09

## 2019-01-09 RX ADMIN — DORZOLAMIDE HYDROCHLORIDE 1 DROP(S): 20 SOLUTION/ DROPS OPHTHALMIC at 04:59

## 2019-01-09 RX ADMIN — Medication 600 MILLIGRAM(S): at 11:07

## 2019-01-09 RX ADMIN — Medication 12.5 MILLIGRAM(S): at 04:58

## 2019-01-09 RX ADMIN — HYDROMORPHONE HYDROCHLORIDE 0.5 MILLIGRAM(S): 2 INJECTION INTRAMUSCULAR; INTRAVENOUS; SUBCUTANEOUS at 12:20

## 2019-01-09 RX ADMIN — Medication 20 MILLIGRAM(S): at 04:59

## 2019-01-09 RX ADMIN — HEPARIN SODIUM 5000 UNIT(S): 5000 INJECTION INTRAVENOUS; SUBCUTANEOUS at 04:59

## 2019-01-09 RX ADMIN — Medication 325 MILLIGRAM(S): at 08:23

## 2019-01-09 RX ADMIN — Medication 600 MILLIGRAM(S): at 11:52

## 2019-01-09 RX ADMIN — Medication 105 MILLIGRAM(S): at 04:59

## 2019-01-09 RX ADMIN — Medication 325 MILLIGRAM(S): at 06:11

## 2019-01-09 RX ADMIN — Medication 105 MILLIGRAM(S): at 15:31

## 2019-01-09 RX ADMIN — DORZOLAMIDE HYDROCHLORIDE 1 DROP(S): 20 SOLUTION/ DROPS OPHTHALMIC at 15:31

## 2019-01-09 RX ADMIN — HYDROMORPHONE HYDROCHLORIDE 0.5 MILLIGRAM(S): 2 INJECTION INTRAMUSCULAR; INTRAVENOUS; SUBCUTANEOUS at 12:01

## 2019-01-09 RX ADMIN — BRIMONIDINE TARTRATE 1 DROP(S): 2 SOLUTION/ DROPS OPHTHALMIC at 15:31

## 2019-01-09 RX ADMIN — Medication 81 MILLIGRAM(S): at 16:19

## 2019-01-09 RX ADMIN — Medication 325 MILLIGRAM(S): at 07:11

## 2019-01-09 RX ADMIN — Medication 1 DROP(S): at 15:31

## 2019-01-09 RX ADMIN — Medication 325 MILLIGRAM(S): at 00:47

## 2019-01-09 RX ADMIN — HEPARIN SODIUM 5000 UNIT(S): 5000 INJECTION INTRAVENOUS; SUBCUTANEOUS at 16:19

## 2019-01-09 RX ADMIN — Medication 325 MILLIGRAM(S): at 09:15

## 2019-01-09 NOTE — CONSULT NOTE ADULT - ATTENDING COMMENTS
Pt examined, chart reviewed case discussed with YAMEL. Family states she had a left CVA with right hemiparesis that was W/U at [facility] 2 weeks ago. Was verbal upon discharge but became more confused over the past week, not recognizing family members with decreased verbal output.  On todays exam she is wakeful and cooperative with short attention span and vigilance. Names objects and follows simple commands.   No clear drift in the UE's but positive for mild asterixis.  Poorly cooperative for exam otherwise. CT head shows no acute changes, subacute left parietal infarct and right ICA occlusion on CTA. Patient appears more encephalopathic without clear new focality. Agree with repeat MRI head and Tx with ASA if no contraindication.  A few right sided myoclonic jerks were noted and while doubtful EPC, would suggest routine EEG. Dysphagia evaluation.
Pt seen with NP. Agree with the plan above. Plan is to have family meeting tomorrow. Pending confirmation for the time.

## 2019-01-09 NOTE — PROGRESS NOTE ADULT - ASSESSMENT
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

## 2019-01-09 NOTE — CONSULT NOTE ADULT - PROBLEM SELECTOR RECOMMENDATION 9
Elevated hsT with negative CK, CKMB; patient remains chest pain free and no ischemic changes noted on EKG  Low suspicion for ACS at this time   Elevated hsT likely 2.2 demand ischemia given elevated Cr and suspected UTI
Patient with non verbal pain indicators and has noticeable grimacing. Patient was taking oxycodone 5mg q6h PRN at home. Would recommend Dilaudid 0.2mg IV q3h PRN.

## 2019-01-09 NOTE — CONSULT NOTE ADULT - SUBJECTIVE AND OBJECTIVE BOX
HPI:  85 year old woman with CHF, breast ca ~9 years ago with R breast removal, and CVA on 12/15 presents with approximately 10 days of intermittent confusion, agitation, and headache. Patient's daughter was at bedside and provided the history. After the patient's stroke, she was sent to [place] for rehab as she was no longer able to go about her ADLs for which she was previously independent. At the rehab center, the patient was frustrated and expressed to her family that she wanted to leave and they weren't doing anything for her. Sometimes the patient would be confused or agitated. About a week and a half ago the episodes of confusion and agitation became more frequent and worse in severity. The family was concerned, and the patient started complaining of abdominal pain at that time. The patient had a Rader catheter placed after her stroke for urinary retention, and the facility attempted to remove and then replaced the Rader for continued retention but without relief of the patient's pain at the time. More recently, the patient had been complaining of intense headache, and received Tylenol without much relief of her pain. These symptoms did not improve and the patient's family insisted on her evaluation at [facility] ED for the headaches and confusion. Her daughter denies that the patient had any fevers, CP/SOB, nausea/vomiting, dysuria or diarrhea, or changes in BM. The patient was on a mechanical soft diet at [facility], and had some decreased appetite yesterday. She did have a normal BM yesterday and passed flatus more recently. (05 Jan 2019 15:44). Patient examined with patients daughter at bedside.     PERTINENT PM/SXH:   CHF (congestive heart failure)  Breast cancer  CVA (cerebral vascular accident)    No significant past surgical history    FAMILY HISTORY:  Family history of hypertension in grandmother (Grandparent)      SOCIAL HISTORY:   Significant other/partner: Yes [ ]  No [x ] Children:  Yes [ x]  No [ ] Lutheran/Spirituality:  Substance hx: Yes[ ]  No [ ]   Tobacco hx:  Yes [ ] No [ ]   Alcohol hx: Yes [ ] No [ ]   Living Situation: [x ]Home  [ ]Long term care  [ ]Rehab [ ]Other    ADVANCE DIRECTIVES:    DNR  MOLST  [ ]  Living Will  [ ]   DECISION MAKER(s):  [ x] Health Care Proxy(s)  [ ] Surrogate(s)  [ ] Guardian           Name(s): Phone Number(s): Carla Hines (daughter) [phone]    BASELINE (I)ADL(s) (prior to admission):  [place]: [ ]Total  [x ] Moderate [ ]Dependent    Allergies    No Known Allergies    Intolerances    MEDICATIONS  (STANDING):  aspirin enteric coated 81 milliGRAM(s) Oral daily  brimonidine 0.2% Ophthalmic Solution 1 Drop(s) Left EYE daily  dorzolamide 2% Ophthalmic Solution 1 Drop(s) Left EYE three times a day  furosemide    Tablet 20 milliGRAM(s) Oral daily  heparin  Injectable 5000 Unit(s) SubCutaneous every 12 hours  metoprolol tartrate 12.5 milliGRAM(s) Oral two times a day  thiamine IVPB 500 milliGRAM(s) IV Intermittent every 8 hours  timolol 0.5% Solution 1 Drop(s) Left EYE daily    MEDICATIONS  (PRN):  acetaminophen   Tablet .. 325 milliGRAM(s) Oral every 6 hours PRN Mild Pain (1 - 3), Moderate Pain (4 - 6), Severe Pain (7 - 10)  ibuprofen  Tablet. 600 milliGRAM(s) Oral once PRN Moderate Pain (4 - 6)    PRESENT SYMPTOMS: [ ]Unable to obtain due to poor mentation   Source if other than patient:  [ ]Family   [ ]Team     Pain (Impact on QOL):    Location -   Severity -        Minimal acceptable level (0-10 scale):  Quality:   Onset:   Duration:                 Aggravating factors -  Relieving factors -  Radiation -    PAIN AD Score:     [email or web address]/painad.pdf (press ctrl +  left click to view)    Dyspnea:  Yes [ ] No [ ] - [ ]Mild [ ]Moderate [ ]Severe  Anxiety:    Yes [ ] No [ ] - [ ]Mild [ ]Moderate [ ]Severe  Fatigue:    Yes [ ] No [ ] - [ ]Mild [ ]Moderate [ ]Severe  Nausea:    Yes [ ] No [ ] - [ ]Mild [ ]Moderate [ ]Severe                         Loss of appetite: Yes [ ] No [ ] - [ ]Mild [ ]Moderate [ ]Severe             Constipation:  Yes [ ] No [ ] - [ ]Mild [ ]Moderate [ ]Severe    Other Symptoms:  [ ]All other review of systems negative     Karnofsky Performance Score/Palliative Performance Status Version 2:         %    http://palliative.info/resource_material/[email or web address].pdf  PHYSICAL EXAM:  Vital Signs Last 24 Hrs  T(C): 36.3 (09 Jan 2019 04:56), Max: 36.9 (08 Jan 2019 21:30)  T(F): 97.4 (09 Jan 2019 04:56), Max: 98.4 (08 Jan 2019 21:30)  HR: 85 (09 Jan 2019 04:56) (82 - 92)  BP: 126/77 (09 Jan 2019 04:56) (112/70 - 126/77)  BP(mean): --  RR: 18 (09 Jan 2019 04:56) (18 - 18)  SpO2: 100% (09 Jan 2019 04:56) (99% - 100%) I&O's Summary    08 Jan 2019 07:01  -  09 Jan 2019 07:00  --------------------------------------------------------  IN: 0 mL / OUT: 350 mL / NET: -350 mL    09 Jan 2019 07:01  -  09 Jan 2019 10:37  --------------------------------------------------------  IN: 0 mL / OUT: 300 mL / NET: -300 mL    GENERAL:  [ ]Alert  [ ]Oriented x   [ ]Lethargic  [ ]Cachexia  [ ]Unarousable  [ ]Verbal  [ ]Non-Verbal  Behavioral:   [ ] Anxiety  [ ] Delirium [ ] Agitation [ ] Other  HEENT:  [ ]Normal   [ ]Dry mouth   [ ]ET Tube/Trach  [ ]Oral lesions  PULMONARY:   [ ]Clear  [ ]Tachypnea  [ ]Audible excessive secretions   [ ]Rhonchi        [ ]Right [ ]Left [ ]Bilateral  [ ]Crackles        [ ]Right [ ]Left [ ]Bilateral  [ ]Wheezing     [ ]Right [ ]Left [ ]Bilateral  CARDIOVASCULAR:    [ ]Regular [ ]Irregular [ ]Tachy  [ ]Rashard [ ]Murmur [ ]Other  GASTROINTESTINAL:  [ ]Soft  [ ]Distended   [ ]+BS  [ ]Non tender [ ]Tender  [ ]PEG [ ]OGT/ NGT  Last BM:     GENITOURINARY:  [ ]Normal [ ] Incontinent   [ ]Oliguria/Anuria   [ ]Rader  MUSCULOSKELETAL:   [ ]Normal   [ ]Weakness  [ ]Bed/Wheelchair bound [ ]Edema  NEUROLOGIC:   [ ]No focal deficits  [ ] Cognitive impairment  [ ] Dysphagia [ ]Dysarthria [ ] Paresis [ ]Other   SKIN:   [ ]Normal   [ ]Pressure ulcer(s)  [ ]Rash    LABS:                        10.0   5.40  )-----------( 336      ( 08 Jan 2019 05:45 )             32.0   01-09    129<L>  |  99  |  18  ----------------------------<  83  Test not performed SPECIMEN GROSSLY HEMOLYZED   |  27  |  1.04    Ca    8.7      09 Jan 2019 05:25  Phos  Test not performed SPECIMEN GROSSLY HEMOLYZED     01-09  Mg     2.1     01-09    TPro  Test not performed SPECIMEN GROSSLY HEMOLYZED  /  Alb  2.9<L>  /  TBili  0.6  /  DBili  x   /  AST  Test not performed SPECIMEN GROSSLY HEMOLYZED.  /  ALT  Test not performed SPECIMEN GROSSLY HEMOLYZED.  /  AlkPhos  286<H>  01-09        RADIOLOGY & ADDITIONAL STUDIES:    PROTEIN CALORIE MALNUTRITION PRESENT: [ ] Yes [ ] No  [ ] PPSV2 < or = to 30% [ ] significant weight loss  [ ] poor nutritional intake [ ] catabolic state [ ] anasarca     Albumin, Serum: 2.9 g/dL (01-09-19 @ 05:25)      REFERRALS:   [ ]Chaplaincy  [ ] Hospice  [ ]Child Life  [ ]Social Work  [ ]Case management [ ]Holistic Therapy   Goals of Care Discussion Document: HPI:  85 year old woman with CHF, breast ca ~9 years ago with R breast removal, and CVA on 12/15 presents with approximately 10 days of intermittent confusion, agitation, and headache. Patient's daughter was at bedside and provided the history. After the patient's stroke, she was sent to [place] for rehab as she was no longer able to go about her ADLs for which she was previously independent. At the rehab center, the patient was frustrated and expressed to her family that she wanted to leave and they weren't doing anything for her. Sometimes the patient would be confused or agitated. About a week and a half ago the episodes of confusion and agitation became more frequent and worse in severity. The family was concerned, and the patient started complaining of abdominal pain at that time. The patient had a Rader catheter placed after her stroke for urinary retention, and the facility attempted to remove and then replaced the Rader for continued retention but without relief of the patient's pain at the time. More recently, the patient had been complaining of intense headache, and received Tylenol without much relief of her pain. These symptoms did not improve and the patient's family insisted on her evaluation at [facility] ED for the headaches and confusion. Her daughter denies that the patient had any fevers, CP/SOB, nausea/vomiting, dysuria or diarrhea, or changes in BM. The patient was on a mechanical soft diet at [facility], and had some decreased appetite yesterday. She did have a normal BM yesterday and passed flatus more recently. (05 Jan 2019 15:44). Patient examined with patients daughter at bedside. Patient unable to verbalize, appears uncomfortable but is able to follow some commands.     PERTINENT PM/SXH:   CHF (congestive heart failure)  Breast cancer  CVA (cerebral vascular accident)    No significant past surgical history    FAMILY HISTORY:  Family history of hypertension in grandmother (Grandparent)      SOCIAL HISTORY:   Significant other/partner: Yes [ ]  No [x ] Children:  Yes [ x]  No [ ] Confucianist/Spirituality:  Substance hx: Yes[ ]  No [ ]   Tobacco hx:  Yes [ ] No [ ]   Alcohol hx: Yes [ ] No [ ]   Living Situation: [x ]Home  [ ]Long term care  [ ]Rehab [ ]Other    ADVANCE DIRECTIVES:    DNR  MOLST  [ ]  Living Will  [ ]   DECISION MAKER(s):  [ x] Health Care Proxy(s)  [ ] Surrogate(s)  [ ] Guardian           Name(s): Phone Number(s): Carla Hines (daughter) [phone]    BASELINE (I)ADL(s) (prior to admission):  [place]: [ ]Total  [x ] Moderate [ ]Dependent    Allergies    No Known Allergies    Intolerances    MEDICATIONS  (STANDING):  aspirin enteric coated 81 milliGRAM(s) Oral daily  brimonidine 0.2% Ophthalmic Solution 1 Drop(s) Left EYE daily  dorzolamide 2% Ophthalmic Solution 1 Drop(s) Left EYE three times a day  furosemide    Tablet 20 milliGRAM(s) Oral daily  heparin  Injectable 5000 Unit(s) SubCutaneous every 12 hours  metoprolol tartrate 12.5 milliGRAM(s) Oral two times a day  thiamine IVPB 500 milliGRAM(s) IV Intermittent every 8 hours  timolol 0.5% Solution 1 Drop(s) Left EYE daily    MEDICATIONS  (PRN):  acetaminophen   Tablet .. 325 milliGRAM(s) Oral every 6 hours PRN Mild Pain (1 - 3), Moderate Pain (4 - 6), Severe Pain (7 - 10)  ibuprofen  Tablet. 600 milliGRAM(s) Oral once PRN Moderate Pain (4 - 6)    PRESENT SYMPTOMS: [x ]Unable to obtain due to poor mentation   Source if other than patient:  [ ]Family   [ ]Team     PAIN AD Score: Grimacing noted on patients face    ROS: Unable to obtain secondary to cognitive impairment       Karnofsky Performance Score/Palliative Performance Status Version 2:       50 %    http://palliative.info/resource_material/[email or web address].pdf  PHYSICAL EXAM:  Vital Signs Last 24 Hrs  T(C): 36.3 (09 Jan 2019 04:56), Max: 36.9 (08 Jan 2019 21:30)  T(F): 97.4 (09 Jan 2019 04:56), Max: 98.4 (08 Jan 2019 21:30)  HR: 85 (09 Jan 2019 04:56) (82 - 92)  BP: 126/77 (09 Jan 2019 04:56) (112/70 - 126/77)  BP(mean): --  RR: 18 (09 Jan 2019 04:56) (18 - 18)  SpO2: 100% (09 Jan 2019 04:56) (99% - 100%) I&O's Summary    08 Jan 20192019 07:01 - 09 Jan 2019 07:00  --------------------------------------------------------  IN: 0 mL / OUT: 350 mL / NET: -350 mL    09 Jan 2019 07:01 - 09 Jan 2019 10:37  --------------------------------------------------------  IN: 0 mL / OUT: 300 mL / NET: -300 mL    GENERAL:  [x ]Alert  [ ]Oriented x   [x ]Lethargic  [ ]Cachexia  [ ]Unarousable  [ ]Verbal  [ ]Non-Verbal  Behavioral:   [ ] Anxiety  [ ] Delirium [ ] Agitation [x ] calm  HEENT:  [ ]Normal   [x ]Dry mouth   [ ]ET Tube/Trach  [ ]Oral lesions  PULMONARY:   [x ]Clear anteriorly  [ ]Tachypnea  [ ]Audible excessive secretions   [ ]Rhonchi        [ ]Right [ ]Left [ ]Bilateral  [ ]Crackles        [ ]Right [ ]Left [ ]Bilateral  [ ]Wheezing     [ ]Right [ ]Left [ ]Bilateral  CARDIOVASCULAR:    [x ]Regular [ ]Irregular [ ]Tachy  [ ]Rashard [ ]Murmur [ ]Other  GASTROINTESTINAL:  [x ]Soft  [ ]Distended   [ ]+BS  [x ]Non tender [ ]Tender  [ ]PEG [ ]OGT/ NGT  Last BM:   GENITOURINARY:  [ ]Normal [ ] Incontinent   [ ]Oliguria/Anuria   [x ]Rader  MUSCULOSKELETAL:   [ ]Normal   [x ]Weakness  [ ]Bed/Wheelchair bound [ ]Edema  NEUROLOGIC:   [ ]No focal deficits  [x ] Cognitive impairment  [ ] Dysphagia [ ]Dysarthria [ ] Paresis [ ]Other   SKIN:   [x ]Normal   [ ]Pressure ulcer(s)  [ ]Rash    LABS:                        10.0   5.40  )-----------( 336      ( 08 Jan 2019 05:45 )             32.0   01-09    129<L>  |  99  |  18  ----------------------------<  83  Test not performed SPECIMEN GROSSLY HEMOLYZED   |  27  |  1.04    Ca    8.7      09 Jan 2019 05:25  Phos  Test not performed SPECIMEN GROSSLY HEMOLYZED     01-09  Mg     2.1     01-09    TPro  Test not performed SPECIMEN GROSSLY HEMOLYZED  /  Alb  2.9<L>  /  TBili  0.6  /  DBili  x   /  AST  Test not performed SPECIMEN GROSSLY HEMOLYZED.  /  ALT  Test not performed SPECIMEN GROSSLY HEMOLYZED.  /  AlkPhos  286<H>  01-09        RADIOLOGY & ADDITIONAL STUDIES:  < from: CT Angio Neck w/ IV Cont (01.05.19 @ 09:42) >  EXAM:  CT ANGIO NECK (W)AW IC      EXAM:  CT ANGIO BRAIN (W)AW IC        PROCEDURE DATE:  Jan 5 2019   IMPRESSION:     CT brain: There is ill-defined decreased attenuation within the left   parietal lobe concerning for evolving subacute infarction with   hyperintense attenuation along the gyral margins; petechial hemorrhagic   transformation is not excluded.    There is volume loss with white matter decreased attenuation, with remote   and age-indeterminate lacunar infarction. There is no large extra-axial   hemorrhage or midline shift.    Bony sclerosis and irregularity along the left greater wing of the   sphenoid. Multiple myeloma versus metastatic disease is not excluded.   Evaluation with PET/CT or nuclear medicine bone scan may be obtained as   clinically warranted.      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    CT angiography brain: Thrombosis of the right ICA within the cavernous, and supraclinoid segment collateral flow provides circulation to the   right cerebral hemisphere.  Decreased size and number the distal left MCA   branches in the area of evolving subacute infarct in the left MCA   posterior distribution.    < end of copied text >    < from: MR Head No Cont (01.06.19 @ 13:09) >    EXAM:  MR BRAIN        PROCEDURE DATE:  Jan 6 2019   IMPRESSION: Age-appropriate involutional and ischemic gliotic changes.   Subacute left parietal infarct with mineralization. A few tiny scattered   acute infarcts in the bilateral centrum semiovale ovale.    < end of copied text >    PROTEIN CALORIE MALNUTRITION PRESENT: [ ] Yes [ ] No  [ ] PPSV2 < or = to 30% [ ] significant weight loss  [ ] poor nutritional intake [ ] catabolic state [ ] anasarca     Albumin, Serum: 2.9 g/dL (01-09-19 @ 05:25)      REFERRALS:   [ ]Chaplaincy  [ ] Hospice  [ ]Child Life  [ ]Social Work  [ ]Case management [ ]Holistic Therapy   Goals of Care Discussion Document: HPI:  85 year old woman with CHF, breast ca ~9 years ago with R breast removal, and CVA on 12/15 presents with approximately 10 days of intermittent confusion, agitation, and headache. Patient's daughter was at bedside and provided the history. After the patient's stroke, she was sent to [place] for rehab as she was no longer able to go about her ADLs for which she was previously independent. At the rehab center, the patient was frustrated and expressed to her family that she wanted to leave and they weren't doing anything for her. Sometimes the patient would be confused or agitated. About a week and a half ago the episodes of confusion and agitation became more frequent and worse in severity. The family was concerned, and the patient started complaining of abdominal pain at that time. The patient had a Rader catheter placed after her stroke for urinary retention, and the facility attempted to remove and then replaced the Rader for continued retention but without relief of the patient's pain at the time. More recently, the patient had been complaining of intense headache, and received Tylenol without much relief of her pain. These symptoms did not improve and the patient's family insisted on her evaluation at [facility] ED for the headaches and confusion. Her daughter denies that the patient had any fevers, CP/SOB, nausea/vomiting, dysuria or diarrhea, or changes in BM. The patient was on a mechanical soft diet at [facility], and had some decreased appetite yesterday. She did have a normal BM yesterday and passed flatus more recently. (05 Jan 2019 15:44). Patient examined with patients daughter at bedside. Patient unable to verbalize, appears uncomfortable but is able to follow some commands.     PERTINENT PM/SXH:   CHF (congestive heart failure)  Breast cancer  CVA (cerebral vascular accident)    No significant past surgical history    FAMILY HISTORY:  Family history of hypertension in grandmother (Grandparent)      SOCIAL HISTORY:   Significant other/partner: Yes [ ]  No [x ] Children:  Yes [ x]  No [ ] Hoahaoism/Spirituality:  Substance hx: Yes[ ]  No [ ]   Tobacco hx:  Yes [ ] No [ ]   Alcohol hx: Yes [ ] No [ ]   Living Situation: [x ]Home  [ ]Long term care  [ ]Rehab [ ]Other    ADVANCE DIRECTIVES:    DNR  MOLST  [ ]  Living Will  [ ]   DECISION MAKER(s):  [ x] Health Care Proxy(s)  [ ] Surrogate(s)  [ ] Guardian           Name(s): Phone Number(s): Carla Hines (daughter) [phone]    BASELINE (I)ADL(s) (prior to admission):  [place]: [ ]Total  [x ] Moderate [ ]Dependent    Allergies    No Known Allergies    Intolerances    MEDICATIONS  (STANDING):  aspirin enteric coated 81 milliGRAM(s) Oral daily  brimonidine 0.2% Ophthalmic Solution 1 Drop(s) Left EYE daily  dorzolamide 2% Ophthalmic Solution 1 Drop(s) Left EYE three times a day  furosemide    Tablet 20 milliGRAM(s) Oral daily  heparin  Injectable 5000 Unit(s) SubCutaneous every 12 hours  metoprolol tartrate 12.5 milliGRAM(s) Oral two times a day  thiamine IVPB 500 milliGRAM(s) IV Intermittent every 8 hours  timolol 0.5% Solution 1 Drop(s) Left EYE daily    MEDICATIONS  (PRN):  acetaminophen   Tablet .. 325 milliGRAM(s) Oral every 6 hours PRN Mild Pain (1 - 3), Moderate Pain (4 - 6), Severe Pain (7 - 10)  ibuprofen  Tablet. 600 milliGRAM(s) Oral once PRN Moderate Pain (4 - 6)    PRESENT SYMPTOMS: [x ]Unable to obtain due to poor mentation   Source if other than patient:  [ ]Family   [ ]Team     PAIN AD Score: Grimacing noted on patients face    ROS: Unable to obtain secondary to cognitive impairment       Karnofsky Performance Score/Palliative Performance Status Version 2:       50 %    http://palliative.info/resource_material/[email or web address].pdf  PHYSICAL EXAM:  Vital Signs Last 24 Hrs  T(C): 36.3 (09 Jan 2019 04:56), Max: 36.9 (08 Jan 2019 21:30)  T(F): 97.4 (09 Jan 2019 04:56), Max: 98.4 (08 Jan 2019 21:30)  HR: 85 (09 Jan 2019 04:56) (82 - 92)  BP: 126/77 (09 Jan 2019 04:56) (112/70 - 126/77)  BP(mean): --  RR: 18 (09 Jan 2019 04:56) (18 - 18)  SpO2: 100% (09 Jan 2019 04:56) (99% - 100%) I&O's Summary    08 Jan 20192019 07:01 - 09 Jan 2019 07:00  --------------------------------------------------------  IN: 0 mL / OUT: 350 mL / NET: -350 mL    09 Jan 2019 07:01 - 09 Jan 2019 10:37  --------------------------------------------------------  IN: 0 mL / OUT: 300 mL / NET: -300 mL    GENERAL:  [x ]Alert  [ ]Oriented x   [x ]Lethargic  [ ]Cachexia  [ ]Unarousable  [ ]Verbal  [ ]Non-Verbal  Behavioral:   [ ] Anxiety  [ ] Delirium [ ] Agitation [x ] calm  HEENT:  [ ]Normal   [x ]Dry mouth   [ ]ET Tube/Trach  [ ]Oral lesions  PULMONARY:   [x ]Clear anteriorly  [ ]Tachypnea  [ ]Audible excessive secretions   [ ]Rhonchi        [ ]Right [ ]Left [ ]Bilateral  [ ]Crackles        [ ]Right [ ]Left [ ]Bilateral  [ ]Wheezing     [ ]Right [ ]Left [ ]Bilateral  CARDIOVASCULAR:    [x ]Regular [ ]Irregular [ ]Tachy  [ ]Rashard [ ]Murmur [ ]Other  GASTROINTESTINAL:  [x ]Soft  [ ]Distended   [ ]+BS  [x ]Non tender [ ]Tender  [ ]PEG [ ]OGT/ NGT  Last BM: 1/9/18  GENITOURINARY:  [ ]Normal [ ] Incontinent   [ ]Oliguria/Anuria   [x ]Rader  MUSCULOSKELETAL:   [ ]Normal   [x ]Weakness  [ ]Bed/Wheelchair bound [ ]Edema  NEUROLOGIC:   [ ]No focal deficits  [x ] Cognitive impairment  [ ] Dysphagia [ ]Dysarthria [ ] Paresis [ ]Other   SKIN:   [x ]Normal   [ ]Pressure ulcer(s)  [ ]Rash    LABS:                        10.0   5.40  )-----------( 336      ( 08 Jan 2019 05:45 )             32.0   01-09    129<L>  |  99  |  18  ----------------------------<  83  Test not performed SPECIMEN GROSSLY HEMOLYZED   |  27  |  1.04    Ca    8.7      09 Jan 2019 05:25  Phos  Test not performed SPECIMEN GROSSLY HEMOLYZED     01-09  Mg     2.1     01-09    TPro  Test not performed SPECIMEN GROSSLY HEMOLYZED  /  Alb  2.9<L>  /  TBili  0.6  /  DBili  x   /  AST  Test not performed SPECIMEN GROSSLY HEMOLYZED.  /  ALT  Test not performed SPECIMEN GROSSLY HEMOLYZED.  /  AlkPhos  286<H>  01-09        RADIOLOGY & ADDITIONAL STUDIES:  < from: CT Angio Neck w/ IV Cont (01.05.19 @ 09:42) >  EXAM:  CT ANGIO NECK (W)AW IC      EXAM:  CT ANGIO BRAIN (W)AW IC        PROCEDURE DATE:  Jan 5 2019   IMPRESSION:     CT brain: There is ill-defined decreased attenuation within the left   parietal lobe concerning for evolving subacute infarction with   hyperintense attenuation along the gyral margins; petechial hemorrhagic   transformation is not excluded.    There is volume loss with white matter decreased attenuation, with remote   and age-indeterminate lacunar infarction. There is no large extra-axial   hemorrhage or midline shift.    Bony sclerosis and irregularity along the left greater wing of the   sphenoid. Multiple myeloma versus metastatic disease is not excluded.   Evaluation with PET/CT or nuclear medicine bone scan may be obtained as   clinically warranted.      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    CT angiography brain: Thrombosis of the right ICA within the cavernous, and supraclinoid segment collateral flow provides circulation to the   right cerebral hemisphere.  Decreased size and number the distal left MCA   branches in the area of evolving subacute infarct in the left MCA   posterior distribution.    < end of copied text >    < from: MR Head No Cont (01.06.19 @ 13:09) >    EXAM:  MR BRAIN        PROCEDURE DATE:  Jan 6 2019   IMPRESSION: Age-appropriate involutional and ischemic gliotic changes.   Subacute left parietal infarct with mineralization. A few tiny scattered   acute infarcts in the bilateral centrum semiovale ovale.    < end of copied text >    PROTEIN CALORIE MALNUTRITION PRESENT: [ ] Yes [ ] No  [ ] PPSV2 < or = to 30% [ ] significant weight loss  [ ] poor nutritional intake [ ] catabolic state [ ] anasarca     Albumin, Serum: 2.9 g/dL (01-09-19 @ 05:25)      REFERRALS:   [ ]Chaplaincy  [ ] Hospice  [ ]Child Life  [ ]Social Work  [ ]Case management [ ]Holistic Therapy   Goals of Care Discussion Document: HPI:  85 year old woman with CHF, breast ca ~9 years ago with R breast removal, and CVA on 12/15 presents with approximately 10 days of intermittent confusion, agitation, and headache. Patient's daughter was at bedside and provided the history. After the patient's stroke, she was sent to [place] for rehab as she was no longer able to go about her ADLs for which she was previously independent. At the rehab center, the patient was frustrated and expressed to her family that she wanted to leave and they weren't doing anything for her. Sometimes the patient would be confused or agitated. About a week and a half ago the episodes of confusion and agitation became more frequent and worse in severity. The family was concerned, and the patient started complaining of abdominal pain at that time. The patient had a Rader catheter placed after her stroke for urinary retention, and the facility attempted to remove and then replaced the Rader for continued retention but without relief of the patient's pain at the time. More recently, the patient had been complaining of intense headache, and received Tylenol without much relief of her pain. These symptoms did not improve and the patient's family insisted on her evaluation at [facility] ED for the headaches and confusion. Her daughter denies that the patient had any fevers, CP/SOB, nausea/vomiting, dysuria or diarrhea, or changes in BM. The patient was on a mechanical soft diet at [place], and had some decreased appetite yesterday. She did have a normal BM yesterday and passed flatus more recently. (05 Jan 2019 15:44). Patient examined with patients daughter at bedside. Patient unable to verbalize, appears uncomfortable but is able to follow some commands.     PERTINENT PM/SXH:   CHF (congestive heart failure)  Breast cancer  CVA (cerebral vascular accident)    No significant past surgical history    FAMILY HISTORY:  Family history of hypertension in grandmother (Grandparent)      SOCIAL HISTORY:   Significant other/partner: Yes [ ]  No [x ] Children:  Yes [ x]  No [ ] Hinduism/Spirituality:  Substance hx: Yes[ ]  No [ ]   Tobacco hx:  Yes [ ] No [ ]   Alcohol hx: Yes [ ] No [ ]   Living Situation: [x ]Home  [ ]Long term care  [ ]Rehab [ ]Other    ADVANCE DIRECTIVES:    Full code MOLST  [ ]  Living Will  [ ]     DECISION MAKER(s):  [ x] Health Care Proxy(s)  [ ] Surrogate(s)  [ ] Guardian           Name(s): Phone Number(s): Carla Hines (daughter) [phone]    BASELINE (I)ADL(s) (prior to admission):  [place]: [ ]Total  [x ] Moderate [ ]Dependent    Allergies    No Known Allergies    Intolerances    MEDICATIONS  (STANDING):  aspirin enteric coated 81 milliGRAM(s) Oral daily  brimonidine 0.2% Ophthalmic Solution 1 Drop(s) Left EYE daily  dorzolamide 2% Ophthalmic Solution 1 Drop(s) Left EYE three times a day  furosemide    Tablet 20 milliGRAM(s) Oral daily  heparin  Injectable 5000 Unit(s) SubCutaneous every 12 hours  metoprolol tartrate 12.5 milliGRAM(s) Oral two times a day  thiamine IVPB 500 milliGRAM(s) IV Intermittent every 8 hours  timolol 0.5% Solution 1 Drop(s) Left EYE daily    MEDICATIONS  (PRN):  acetaminophen   Tablet .. 325 milliGRAM(s) Oral every 6 hours PRN Mild Pain (1 - 3), Moderate Pain (4 - 6), Severe Pain (7 - 10)  ibuprofen  Tablet. 600 milliGRAM(s) Oral once PRN Moderate Pain (4 - 6)    PRESENT SYMPTOMS: [x ]Unable to obtain due to poor mentation   Source if other than patient:  [ ]Family   [ ]Team     PAIN AD Score: Grimacing noted on patients face    ROS: Unable to obtain secondary to cognitive impairment     Karnofsky Performance Score/Palliative Performance Status Version 2:       50 %    http://palliative.info/resource_material/[email or web address].pdf    PHYSICAL EXAM:  Vital Signs Last 24 Hrs  T(C): 36.3 (09 Jan 2019 04:56), Max: 36.9 (08 Jan 2019 21:30)  T(F): 97.4 (09 Jan 2019 04:56), Max: 98.4 (08 Jan 2019 21:30)  HR: 85 (09 Jan 2019 04:56) (82 - 92)  BP: 126/77 (09 Jan 2019 04:56) (112/70 - 126/77)  BP(mean): --  RR: 18 (09 Jan 2019 04:56) (18 - 18)  SpO2: 100% (09 Jan 2019 04:56) (99% - 100%) I&O's Summary    08 Jan 20192019 07:01 - 09 Jan 2019 07:00  --------------------------------------------------------  IN: 0 mL / OUT: 350 mL / NET: -350 mL    09 Jan 2019 07:01 - 09 Jan 2019 10:37  --------------------------------------------------------  IN: 0 mL / OUT: 300 mL / NET: -300 mL    GENERAL:  [x ]Alert  [ ]Oriented x   [x ]Lethargic  [ ]Cachexia  [ ]Unarousable  [ ]Verbal  [ ]Non-Verbal  Behavioral:   [ ] Anxiety  [ ] Delirium [ ] Agitation [x ] calm  HEENT:  [ ]Normal   [x ]Dry mouth   [ ]ET Tube/Trach  [ ]Oral lesions  PULMONARY:   [x ]Clear anteriorly  [ ]Tachypnea  [ ]Audible excessive secretions   [ ]Rhonchi        [ ]Right [ ]Left [ ]Bilateral  [ ]Crackles        [ ]Right [ ]Left [ ]Bilateral  [ ]Wheezing     [ ]Right [ ]Left [ ]Bilateral  CARDIOVASCULAR:    [x ]Regular [ ]Irregular [ ]Tachy  [ ]Rashard [ ]Murmur [ ]Other  GASTROINTESTINAL:  [x ]Soft  [ ]Distended   [ ]+BS  [x ]Non tender [ ]Tender  [ ]PEG [ ]OGT/ NGT  Last BM: 1/9/18  GENITOURINARY:  [ ]Normal [ ] Incontinent   [ ]Oliguria/Anuria   [x ]Rader  MUSCULOSKELETAL:   [ ]Normal   [x ]Weakness  [ ]Bed/Wheelchair bound [ ]Edema  NEUROLOGIC:   [ ]No focal deficits  [x ] Cognitive impairment  [ ] Dysphagia [ ]Dysarthria [ ] Paresis [ ]Other   SKIN:   [x ]Normal   [ ]Pressure ulcer(s)  [ ]Rash    LABS:                        10.0   5.40  )-----------( 336      ( 08 Jan 2019 05:45 )             32.0   01-09    129<L>  |  99  |  18  ----------------------------<  83  Test not performed SPECIMEN GROSSLY HEMOLYZED   |  27  |  1.04    Ca    8.7      09 Jan 2019 05:25  Phos  Test not performed SPECIMEN GROSSLY HEMOLYZED     01-09  Mg     2.1     01-09    TPro  Test not performed SPECIMEN GROSSLY HEMOLYZED  /  Alb  2.9<L>  /  TBili  0.6  /  DBili  x   /  AST  Test not performed SPECIMEN GROSSLY HEMOLYZED.  /  ALT  Test not performed SPECIMEN GROSSLY HEMOLYZED.  /  AlkPhos  286<H>  01-09    RADIOLOGY & ADDITIONAL STUDIES:    < from: CT Angio Neck w/ IV Cont (01.05.19 @ 09:42) >  EXAM:  CT ANGIO NECK (W)AW IC      EXAM:  CT ANGIO BRAIN (W)AW IC        PROCEDURE DATE:  Jan 5 2019   IMPRESSION:     CT brain: There is ill-defined decreased attenuation within the left   parietal lobe concerning for evolving subacute infarction with   hyperintense attenuation along the gyral margins; petechial hemorrhagic   transformation is not excluded.    There is volume loss with white matter decreased attenuation, with remote   and age-indeterminate lacunar infarction. There is no large extra-axial   hemorrhage or midline shift.    Bony sclerosis and irregularity along the left greater wing of the   sphenoid. Multiple myeloma versus metastatic disease is not excluded.   Evaluation with PET/CT or nuclear medicine bone scan may be obtained as   clinically warranted.    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    CT angiography brain: Thrombosis of the right ICA within the cavernous, and supraclinoid segment collateral flow provides circulation to the   right cerebral hemisphere.  Decreased size and number the distal left MCA   branches in the area of evolving subacute infarct in the left MCA   posterior distribution.    < from: MR Head No Cont (01.06.19 @ 13:09) >    EXAM:  MR BRAIN      PROCEDURE DATE:  Jan 6 2019   IMPRESSION: Age-appropriate involutional and ischemic gliotic changes.   Subacute left parietal infarct with mineralization. A few tiny scattered   acute infarcts in the bilateral centrum semiovale ovale.    PROTEIN CALORIE MALNUTRITION PRESENT: [ ] Yes [ ] No  [ ] PPSV2 < or = to 30% [ ] significant weight loss  [ ] poor nutritional intake [ ] catabolic state [ ] anasarca     Albumin, Serum: 2.9 g/dL (01-09-19 @ 05:25)    REFERRALS:   [ ]Chaplaincy  [ ] Hospice  [ ]Child Life  [ ]Social Work  [ ]Case management [ ]Holistic Therapy   Goals of Care Discussion Document:

## 2019-01-09 NOTE — CONSULT NOTE ADULT - PROBLEM SELECTOR RECOMMENDATION 2
Patient with multiple CVAs, is currently non verbal and unable to make her needs known. Appreciate neurology involvement.

## 2019-01-09 NOTE — PROGRESS NOTE ADULT - PROBLEM SELECTOR PLAN 5
with recent tylenol use for headaches and on statin unclear when initiated, may also be 2/2 HF and hepatic congestion  hold statin  hepatitis panel negative  may c/w acetaminophen carefully for pain (maintain < 4g per day)

## 2019-01-09 NOTE — PROGRESS NOTE ADULT - ATTENDING COMMENTS
# CVA: CTA with R ICA stenosis with collateral, MRI with Subacute left parietal infarct with mineralization. A few tiny scattered acute infarcts in the bilateral centrum semiovale ovale.  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

## 2019-01-09 NOTE — PROGRESS NOTE ADULT - PROBLEM SELECTOR PLAN 3
recent stroke and evolving lesion seen on head CT with continued MS changes  MRI: subacute left parietal infarct, and few tiny scattered   acute infarcts in the bilateral centrum semiovale ovale.  -speech and swallow: diet is mechanical soft with thin liquids  -PT/OT recommending rehab, social work/ case management on board

## 2019-01-09 NOTE — CONSULT NOTE ADULT - PROBLEM SELECTOR RECOMMENDATION 4
Primary team to schedule meeting for tomorrow. Will continue to manage patients symptoms. Daughter understands the overall poor prognosis but needs help clarifying the issues with her other family members.

## 2019-01-09 NOTE — PROGRESS NOTE ADULT - SUBJECTIVE AND OBJECTIVE BOX
***************************************************************  Sharmila Bales, PGY1  Internal Medicine   pager [phone]  ***************************************************************    HONEY MOREL  85y  MRN: [id number]    Patient is a 85y old woman admitted for encephalopathy.     Subjective: Patient was seen and examined at the bedside. There were no acute events overnight. She is AAO x0. Her daughter was at bedside, informed patient in pain, indicating a headache. Unable to assess further symptoms due to patient mental status.     MEDICATIONS  (STANDING):  aspirin enteric coated 81 milliGRAM(s) Oral daily  brimonidine 0.2% Ophthalmic Solution 1 Drop(s) Left EYE daily  dorzolamide 2% Ophthalmic Solution 1 Drop(s) Left EYE three times a day  furosemide    Tablet 20 milliGRAM(s) Oral daily  heparin  Injectable 5000 Unit(s) SubCutaneous every 12 hours  metoprolol tartrate 12.5 milliGRAM(s) Oral two times a day  thiamine IVPB 500 milliGRAM(s) IV Intermittent every 8 hours  timolol 0.5% Solution 1 Drop(s) Left EYE daily    MEDICATIONS  (PRN):  acetaminophen   Tablet .. 325 milliGRAM(s) Oral every 6 hours PRN Mild Pain (1 - 3), Moderate Pain (4 - 6), Severe Pain (7 - 10)  ibuprofen  Tablet. 600 milliGRAM(s) Oral once PRN Moderate Pain (4 - 6)      Objective:    Vitals: Vital Signs Last 24 Hrs  T(C): 36.3 (01-09-19 @ 04:56), Max: 36.9 (01-08-19 @ 21:30)  T(F): 97.4 (01-09-19 @ 04:56), Max: 98.4 (01-08-19 @ 21:30)  HR: 85 (01-09-19 @ 04:56) (82 - 92)  BP: 126/77 (01-09-19 @ 04:56) (112/70 - 126/77)  BP(mean): --  RR: 18 (01-09-19 @ 04:56) (18 - 18)  SpO2: 100% (01-09-19 @ 04:56) (99% - 100%)            I&O's Summary    08 Jan 2019 07:01  -  09 Jan 2019 07:00  --------------------------------------------------------  IN: 0 mL / OUT: 350 mL / NET: -350 mL    09 Jan 2019 07:01  -  09 Jan 2019 09:06  --------------------------------------------------------  IN: 0 mL / OUT: 300 mL / NET: -300 mL        PHYSICAL EXAM:  GENERAL: Elderly woman, very minimally responsive to questions  HEAD:  Poor dentition  EYES: Conjunctiva and sclera clear  CHEST/LUNG: Clear to auscultation bilaterally; No rales, rhonchi, wheezing, or rubs  HEART: Regular rate and rhythm; No murmurs, rubs, or gallops.  ABDOMEN: Soft, Nontender, Nondistended, BS+   SKIN: Dry appearing  NERVOUS SYSTEM:  Alert & Oriented X0, unable to further assess due to patient mental status    LABS:  01-09    129<L>  |  99  |  18  ----------------------------<  83  Test not performed SPECIMEN GROSSLY HEMOLYZED   |  27  |  1.04  01-08    137  |  100  |  20  ----------------------------<  90  4.6   |  27  |  1.40<H>  01-06    141  |  104  |  14  ----------------------------<  96  3.9   |  28  |  0.52    Ca    8.7      09 Jan 2019 05:25  Ca    9.1      08 Jan 2019 05:45  Ca    7.9<L>      06 Jan 2019 18:30  Phos  Test not performed SPECIMEN GROSSLY HEMOLYZED     01-09  Mg     2.1     01-09    TPro  Test not performed SPECIMEN GROSSLY HEMOLYZED  /  Alb  2.9<L>  /  TBili  0.6  /  DBili  x   /  AST  Test not performed SPECIMEN GROSSLY HEMOLYZED.  /  ALT  Test not performed SPECIMEN GROSSLY HEMOLYZED.  /  AlkPhos  286<H>  01-09  TPro  7.0  /  Alb  3.1<L>  /  TBili  0.4  /  DBili  x   /  AST  72<H>  /  ALT  94<H>  /  AlkPhos  383<H>  01-08                                              10.0   5.40  )-----------( 336      ( 08 Jan 2019 05:45 )             32.0     CAPILLARY BLOOD GLUCOSE          RADIOLOGY & ADDITIONAL TESTS:    Imaging Personally Reviewed:  [x ] YES  [ ] NO    Consultants involved in case:   Consultant(s) Notes Reviewed:  [ x] YES  [ ] NO:   Care Discussed with Consultants/Other Providers [x ] YES  [ ] NO

## 2019-01-09 NOTE — PROGRESS NOTE ADULT - PROBLEM SELECTOR PLAN 1
-likely 2/2 residual deficits from CVA December 2018 and acute infarcts in the b/l centrum semiovale ovale.  -differential included infectious vs. new CVA, UA negative, MRI with old (subacute) left parietal infarct, acute watershed like infarcts in b/l centrum semiovale ovale  -collateral information from family suggests patient is at new baseline post CVA  -neurology evaluated, suggest c/w ASA, permissive HTN, underlying pathology r/o cardiogenic vs neoplastic, however patient with poor prognosis  -f/u TTE

## 2019-01-09 NOTE — CONSULT NOTE ADULT - ASSESSMENT
85 year old RH AA female with PMHx of stroke 2 weeks PTA with right sided deficits (unable to further specify, but is now ambulating with walker but was not using one prior to stroke), Breast Ca, CHF who was at [facility] for PT/rehab. Since the stroke the patient has been unable to do her ADLs, and has had worsened mentation and memory. The patient has been having worsening confusion and increased fatigue ability. She has no known recent infections, but had ?abdominal pain earlier this week.  Outside hospital records are not available but family states that they will bring them in.  NIHSS (at least) 15, preMRS 3    Exam is limited, patient is saying only a few nonsensical words but is markedly aphasic, following only simple commands (able to raise left leg, squeeze fingers).   Etiology: L sided hemispheric subacute infarcts, etiology ?ESUS, r/o metabolic , toxic causes for encephalopathy. the R ICA stenosis clinically and via imaging does not correlate with her known deficits.    Plan  [] MRI brain w/o contrast  [] tox/metabolic workup per medicine/ED team (cxr, blood cx, UA and cx, abdominal imaging for abd pain earlier this week)  [] obtain collateral information (family was informed to bring in records from OSH)
85yoF w/ PMHx CHF, h/o breast Ca, CVA in december 2018 presents with intermittent confusion, agitation and headache.  Patient being treated with UTI given chronic murray.  Cardiology consulted for elevated hsT 58 and then 81 upon repeat.
85 year old woman with Multiple CVA's, pain, debility and encounter for palliative care.

## 2019-01-10 DIAGNOSIS — Z71.89 OTHER SPECIFIED COUNSELING: ICD-10-CM

## 2019-01-10 LAB
ANION GAP SERPL CALC-SCNC: 11 MEQ/L — SIGNIFICANT CHANGE UP (ref 7–14)
BUN SERPL-MCNC: 22 MG/DL — SIGNIFICANT CHANGE UP (ref 7–23)
CALCIUM SERPL-MCNC: 9.7 MG/DL — SIGNIFICANT CHANGE UP (ref 8.4–10.5)
CHLORIDE SERPL-SCNC: 98 MMOL/L — SIGNIFICANT CHANGE UP (ref 98–107)
CO2 SERPL-SCNC: 28 MMOL/L — SIGNIFICANT CHANGE UP (ref 22–31)
CREAT SERPL-MCNC: 1.32 MG/DL — HIGH (ref 0.5–1.3)
GLUCOSE SERPL-MCNC: 109 MG/DL — HIGH (ref 70–99)
MAGNESIUM SERPL-MCNC: 2.2 MG/DL — SIGNIFICANT CHANGE UP (ref 1.6–2.6)
PHOSPHATE SERPL-MCNC: 4.1 MG/DL — SIGNIFICANT CHANGE UP (ref 2.5–4.5)
POTASSIUM SERPL-MCNC: 5 MMOL/L — SIGNIFICANT CHANGE UP (ref 3.5–5.3)
POTASSIUM SERPL-SCNC: 5 MMOL/L — SIGNIFICANT CHANGE UP (ref 3.5–5.3)
SODIUM SERPL-SCNC: 137 MMOL/L — SIGNIFICANT CHANGE UP (ref 135–145)

## 2019-01-10 PROCEDURE — 99497 ADVNCD CARE PLAN 30 MIN: CPT | Mod: 25

## 2019-01-10 PROCEDURE — 99233 SBSQ HOSP IP/OBS HIGH 50: CPT

## 2019-01-10 PROCEDURE — 99233 SBSQ HOSP IP/OBS HIGH 50: CPT | Mod: GC

## 2019-01-10 PROCEDURE — 99498 ADVNCD CARE PLAN ADDL 30 MIN: CPT | Mod: 25

## 2019-01-10 PROCEDURE — 93306 TTE W/DOPPLER COMPLETE: CPT | Mod: 26

## 2019-01-10 RX ORDER — HYDROMORPHONE HYDROCHLORIDE 2 MG/ML
1 INJECTION INTRAMUSCULAR; INTRAVENOUS; SUBCUTANEOUS EVERY 4 HOURS
Qty: 0 | Refills: 0 | Status: DISCONTINUED | OUTPATIENT
Start: 2019-01-10 | End: 2019-01-14

## 2019-01-10 RX ORDER — ACETAMINOPHEN 500 MG
650 TABLET ORAL ONCE
Qty: 0 | Refills: 0 | Status: COMPLETED | OUTPATIENT
Start: 2019-01-10 | End: 2019-01-10

## 2019-01-10 RX ORDER — POLYETHYLENE GLYCOL 3350 17 G/17G
17 POWDER, FOR SOLUTION ORAL
Qty: 0 | Refills: 0 | Status: DISCONTINUED | OUTPATIENT
Start: 2019-01-10 | End: 2019-01-14

## 2019-01-10 RX ORDER — ACETAMINOPHEN 500 MG
325 TABLET ORAL EVERY 6 HOURS
Qty: 0 | Refills: 0 | Status: DISCONTINUED | OUTPATIENT
Start: 2019-01-10 | End: 2019-01-14

## 2019-01-10 RX ORDER — DOCUSATE SODIUM 100 MG
100 CAPSULE ORAL THREE TIMES A DAY
Qty: 0 | Refills: 0 | Status: DISCONTINUED | OUTPATIENT
Start: 2019-01-10 | End: 2019-01-14

## 2019-01-10 RX ORDER — SENNA PLUS 8.6 MG/1
2 TABLET ORAL AT BEDTIME
Qty: 0 | Refills: 0 | Status: DISCONTINUED | OUTPATIENT
Start: 2019-01-10 | End: 2019-01-14

## 2019-01-10 RX ADMIN — HEPARIN SODIUM 5000 UNIT(S): 5000 INJECTION INTRAVENOUS; SUBCUTANEOUS at 05:52

## 2019-01-10 RX ADMIN — HYDROMORPHONE HYDROCHLORIDE 1 MILLIGRAM(S): 2 INJECTION INTRAMUSCULAR; INTRAVENOUS; SUBCUTANEOUS at 19:33

## 2019-01-10 RX ADMIN — SENNA PLUS 2 TABLET(S): 8.6 TABLET ORAL at 19:33

## 2019-01-10 RX ADMIN — HYDROMORPHONE HYDROCHLORIDE 1 MILLIGRAM(S): 2 INJECTION INTRAMUSCULAR; INTRAVENOUS; SUBCUTANEOUS at 20:30

## 2019-01-10 RX ADMIN — HYDROMORPHONE HYDROCHLORIDE 0.5 MILLIGRAM(S): 2 INJECTION INTRAMUSCULAR; INTRAVENOUS; SUBCUTANEOUS at 10:20

## 2019-01-10 RX ADMIN — Medication 100 MILLIGRAM(S): at 19:32

## 2019-01-10 RX ADMIN — Medication 20 MILLIGRAM(S): at 05:52

## 2019-01-10 RX ADMIN — Medication 10 MILLIGRAM(S): at 18:11

## 2019-01-10 RX ADMIN — HYDROMORPHONE HYDROCHLORIDE 0.5 MILLIGRAM(S): 2 INJECTION INTRAMUSCULAR; INTRAVENOUS; SUBCUTANEOUS at 10:35

## 2019-01-10 RX ADMIN — HYDROMORPHONE HYDROCHLORIDE 0.5 MILLIGRAM(S): 2 INJECTION INTRAMUSCULAR; INTRAVENOUS; SUBCUTANEOUS at 15:01

## 2019-01-10 RX ADMIN — HYDROMORPHONE HYDROCHLORIDE 0.5 MILLIGRAM(S): 2 INJECTION INTRAMUSCULAR; INTRAVENOUS; SUBCUTANEOUS at 14:46

## 2019-01-10 NOTE — PROGRESS NOTE ADULT - ATTENDING COMMENTS
Pt seen with NP. Family meeting today, discussed prognosis of vascular dementia, family shared that pt would like to be comfortable at home. Recommended to take pt home with hospice services. Hospice referral made. See GOC note. At this time symptoms are controlled. Goals are established, will sign off, reconsult as needed.

## 2019-01-10 NOTE — PROGRESS NOTE ADULT - PROBLEM SELECTOR PLAN 4
Please refer to Advance care planning note. Please refer to [facility] note.   Discussion had with patients Health care proxy, daughter, and granddaughter. Patients overall prognosis was discussed and her goals at the end of life. The family stated that the patient always wanted to die at home and be kept comfortable. The philosophy of hospice was discussed and the family stated (including the HCP) that DNR/DNI would be consistent with the patients wishes at the end of life, as well as no feeding tube, Do not hospitalize unless pain or other symptoms cannot otherwise be controlled, trial of IV fluids, Determine use of antibiotics when an infection occurs and comfort care. MOLST filled out and placed in chart.

## 2019-01-10 NOTE — PROGRESS NOTE ADULT - SUBJECTIVE AND OBJECTIVE BOX
INTERVAL HPI/OVERNIGHT EVENTS:    Code Status:   Allergies    No Known Allergies    Intolerances    MEDICATIONS  (STANDING):  acetaminophen   Tablet .. 650 milliGRAM(s) Oral once  aspirin enteric coated 81 milliGRAM(s) Oral daily  brimonidine 0.2% Ophthalmic Solution 1 Drop(s) Left EYE daily  docusate sodium 100 milliGRAM(s) Oral three times a day  dorzolamide 2% Ophthalmic Solution 1 Drop(s) Left EYE three times a day  furosemide    Tablet 20 milliGRAM(s) Oral daily  heparin  Injectable 5000 Unit(s) SubCutaneous every 12 hours  polyethylene glycol 3350 17 Gram(s) Oral two times a day  senna 2 Tablet(s) Oral at bedtime  timolol 0.5% Solution 1 Drop(s) Left EYE daily    MEDICATIONS  (PRN):  acetaminophen   Tablet .. 325 milliGRAM(s) Oral every 6 hours PRN Mild Pain (1 - 3), Moderate Pain (4 - 6), Severe Pain (7 - 10)  HYDROmorphone  Injectable 0.5 milliGRAM(s) IV Push every 3 hours PRN Moderate to severe pain      PRESENT SYMPTOMS: [x ]Unable to obtain due to poor mentation   Source if other than patient:  [ ]Family   [ ]Team     PAIN AD Score: Grimacing noted on patients face    ROS: Unable to obtain secondary to cognitive impairment     Karnofsky Performance Score/Palliative Performance Status Version 2:       50 %    http://palliative.info/resource_material/[email or web address].pdf    PHYSICAL EXAM:  Vital Signs Last 24 Hrs  T(C): 36.2 (10 Murtaza 2019 12:28), Max: 36.4 (10 Murtaza 2019 06:07)  T(F): 97.1 (10 Murtaza 2019 12:28), Max: 97.6 (10 Murtaza 2019 06:07)  HR: 71 (10 Murtaza 2019 12:28) (71 - 89)  BP: 116/61 (10 Murtaza 2019 12:28) (100/60 - 126/81)  BP(mean): --  RR: 16 (10 Murtaza 2019 12:28) (16 - 16)  SpO2: 100% (10 Murtaza 2019 12:28) (100% - 100%) I&O's Summary    09 Jan 2019 07:01  -  10 Murtaza 2019 07:00  --------------------------------------------------------  IN: 440 mL / OUT: 600 mL / NET: -160 mL    10 Murtaza 2019 07:01  -  10 Murtaza 2019 13:12  --------------------------------------------------------  IN: 0 mL / OUT: 50 mL / NET: -50 mL    GENERAL:  [x ]Alert  [ ]Oriented x   [x ]Lethargic  [ ]Cachexia  [ ]Unarousable  [ ]Verbal  [ ]Non-Verbal  Behavioral:   [ ] Anxiety  [ ] Delirium [ ] Agitation [x ] calm  HEENT:  [ ]Normal   [x ]Dry mouth   [ ]ET Tube/Trach  [ ]Oral lesions  PULMONARY:   [x ]Clear anteriorly  [ ]Tachypnea  [ ]Audible excessive secretions   [ ]Rhonchi        [ ]Right [ ]Left [ ]Bilateral  [ ]Crackles        [ ]Right [ ]Left [ ]Bilateral  [ ]Wheezing     [ ]Right [ ]Left [ ]Bilateral  CARDIOVASCULAR:    [x ]Regular [ ]Irregular [ ]Tachy  [ ]Rashard [ ]Murmur [ ]Other  GASTROINTESTINAL:  [x ]Soft  [ ]Distended   [ ]+BS  [x ]Non tender [ ]Tender  [ ]PEG [ ]OGT/ NGT  Last BM: 1/9/18  GENITOURINARY:  [ ]Normal [ ] Incontinent   [ ]Oliguria/Anuria   [x ]Rader  MUSCULOSKELETAL:   [ ]Normal   [x ]Weakness  [ ]Bed/Wheelchair bound [ ]Edema  NEUROLOGIC:   [ ]No focal deficits  [x ] Cognitive impairment  [ ] Dysphagia [ ]Dysarthria [ ] Paresis [ ]Other   SKIN:   [x ]Normal   [ ]Pressure ulcer(s)  [ ]Rash    LABS:  01-10    137  |  98  |  22  ----------------------------<  109<H>  5.0   |  28  |  1.32<H>    Ca    9.7      10 Murtaza 2019 06:03  Phos  4.1     01-10  Mg     2.2     01-10    TPro  Test not performed SPECIMEN GROSSLY HEMOLYZED  /  Alb  2.9<L>  /  TBili  0.6  /  DBili  x   /  AST  Test not performed SPECIMEN GROSSLY HEMOLYZED.  /  ALT  Test not performed SPECIMEN GROSSLY HEMOLYZED.  /  AlkPhos  286<H>  01-09    RADIOLOGY & ADDITIONAL STUDIES:    Protein Calorie Malnutrition Present: [ ] yes [ ] no  [ ] PPSV2 < or = 30%  [ ] significant weight loss [ ] poor nutritional intake [ ] anasarca [ ] catabolic state Albumin, Serum: 2.9 g/dL (01-09-19 @ 05:25)      REFERRALS:   [ ]Chaplaincy  [ x] Hospice  [ ]Child Life  [ ]Social Work  [ ]Case management [ ]Holistic Therapy   Goals of Care Document: INTERVAL HPI/OVERNIGHT EVENTS:    Palliative Care following for complex decision making in the setting of CVA and vascular dementia.     Code Status:   Allergies    No Known Allergies    Intolerances    MEDICATIONS  (STANDING):  acetaminophen   Tablet .. 650 milliGRAM(s) Oral once  aspirin enteric coated 81 milliGRAM(s) Oral daily  brimonidine 0.2% Ophthalmic Solution 1 Drop(s) Left EYE daily  docusate sodium 100 milliGRAM(s) Oral three times a day  dorzolamide 2% Ophthalmic Solution 1 Drop(s) Left EYE three times a day  furosemide    Tablet 20 milliGRAM(s) Oral daily  heparin  Injectable 5000 Unit(s) SubCutaneous every 12 hours  polyethylene glycol 3350 17 Gram(s) Oral two times a day  senna 2 Tablet(s) Oral at bedtime  timolol 0.5% Solution 1 Drop(s) Left EYE daily    MEDICATIONS  (PRN):  acetaminophen   Tablet .. 325 milliGRAM(s) Oral every 6 hours PRN Mild Pain (1 - 3), Moderate Pain (4 - 6), Severe Pain (7 - 10)  HYDROmorphone  Injectable 0.5 milliGRAM(s) IV Push every 3 hours PRN Moderate to severe pain      PRESENT SYMPTOMS: [x ]Unable to obtain due to poor mentation   Source if other than patient:  [ ]Family   [ ]Team     PAIN AD Score: Grimacing noted on patients face    ROS: Unable to obtain secondary to cognitive impairment     Karnofsky Performance Score/Palliative Performance Status Version 2:       30 %    http://palliative.info/resource_material/[email or web address].pdf    PHYSICAL EXAM:  Vital Signs Last 24 Hrs  T(C): 36.2 (10 Murtaza 2019 12:28), Max: 36.4 (10 Murtaza 2019 06:07)  T(F): 97.1 (10 Murtaza 2019 12:28), Max: 97.6 (10 Murtaza 2019 06:07)  HR: 71 (10 Murtaza 2019 12:28) (71 - 89)  BP: 116/61 (10 Murtaza 2019 12:28) (100/60 - 126/81)  BP(mean): --  RR: 16 (10 Murtaza 2019 12:28) (16 - 16)  SpO2: 100% (10 Murtaza 2019 12:28) (100% - 100%) I&O's Summary    09 Jan 2019 07:01  -  10 Murtaza 2019 07:00  --------------------------------------------------------  IN: 440 mL / OUT: 600 mL / NET: -160 mL    10 Murtaza 2019 07:01  -  10 Murtaza 2019 13:12  --------------------------------------------------------  IN: 0 mL / OUT: 50 mL / NET: -50 mL    GENERAL:  [ ]Alert  [ ]Oriented x   [x ]Lethargic  [ ]Cachexia  [ ]Unarousable  [ ]Verbal  [ ]Non-Verbal  Behavioral:   [ ] Anxiety  [ ] Delirium [ ] Agitation [x ] calm  HEENT:  [ ]Normal   [x ]Dry mouth   [ ]ET Tube/Trach  [ ]Oral lesions  PULMONARY:   [x ]Clear anteriorly  [ ]Tachypnea  [ ]Audible excessive secretions   [ ]Rhonchi        [ ]Right [ ]Left [ ]Bilateral  [ ]Crackles        [ ]Right [ ]Left [ ]Bilateral  [ ]Wheezing     [ ]Right [ ]Left [ ]Bilateral  CARDIOVASCULAR:    [x ]Regular [ ]Irregular [ ]Tachy  [ ]Rashard [ ]Murmur [ ]Other  GASTROINTESTINAL:  [x ]Soft  [ ]Distended   [ ]+BS  [x ]Non tender [ ]Tender  [ ]PEG [ ]OGT/ NGT  Last BM: 1/9/18  GENITOURINARY:  [ ]Normal [ ] Incontinent   [ ]Oliguria/Anuria   [x ]Rader  MUSCULOSKELETAL:   [ ]Normal   [x ]Weakness  [ ]Bed/Wheelchair bound [ ]Edema  NEUROLOGIC:   [ ]No focal deficits  [x ] Cognitive impairment  [ ] Dysphagia [ ]Dysarthria [ ] Paresis [ ]Other   SKIN:   [x ]Normal   [ ]Pressure ulcer(s)  [ ]Rash    LABS:  01-10    137  |  98  |  22  ----------------------------<  109<H>  5.0   |  28  |  1.32<H>    Ca    9.7      10 Murtaza 2019 06:03  Phos  4.1     01-10  Mg     2.2     01-10    TPro  Test not performed SPECIMEN GROSSLY HEMOLYZED  /  Alb  2.9<L>  /  TBili  0.6  /  DBili  x   /  AST  Test not performed SPECIMEN GROSSLY HEMOLYZED.  /  ALT  Test not performed SPECIMEN GROSSLY HEMOLYZED.  /  AlkPhos  286<H>  01-09    RADIOLOGY & ADDITIONAL STUDIES: reviewed.     Protein Calorie Malnutrition Present: [ ] yes [ ] no  [x ] PPSV2 < or = 30%  [ ] significant weight loss [ ] poor nutritional intake [ ] anasarca [ ] catabolic state Albumin, Serum: 2.9 g/dL (01-09-19 @ 05:25)    REFERRALS:   [ ]Chaplaincy  [ x] Hospice  [ ]Child Life  [ ]Social Work  [ ]Case management [ ]Holistic Therapy   Goals of Care Document:

## 2019-01-10 NOTE — PROGRESS NOTE ADULT - PROBLEM SELECTOR PLAN 5
Discussion had with patients Health care proxy, daughter, and granddaughter. Patients overall prognosis was discussed and her goals at the end of life. The family stated that the patient always wanted to die at home and be kept comfortable. The philosophy of hospice was discussed and the family stated (including the HCP) that DNR/DNI would be consistent with the patients wishes at the end of life, as well as no feeding tube, Do not hospitalize unless pain or other symptoms cannot otherwise be controlled, trial of IV fluids, Determine use of antibiotics when an infection occurs and comfort care. MELANIEST filled out and placed in chart. 30 minutes was spent talking to the patients family face to face about advance care planning. 45 minutes of face to face discussion addressing advanced directives and advanced care planning. MOLST filled out, hospice referral made.

## 2019-01-10 NOTE — PROGRESS NOTE ADULT - PROBLEM SELECTOR PLAN 2
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

## 2019-01-10 NOTE — PROGRESS NOTE ADULT - SUBJECTIVE AND OBJECTIVE BOX
***************************************************************  Sharmila Omergiovanna, PGY1  Internal Medicine   pager [phone]  ***************************************************************    HONEY MOREL  85y  MRN: [id number]    Patient is a 85y old woman admitted for encephalopathy.     Subjective: Patient was seen and examined at the bedside. There were no acute events overnight. She is AAO x0. Her daughter was at bedside, discussed impending family meeting at 11am today.    MEDICATIONS  (STANDING):  aspirin enteric coated 81 milliGRAM(s) Oral daily  brimonidine 0.2% Ophthalmic Solution 1 Drop(s) Left EYE daily  dorzolamide 2% Ophthalmic Solution 1 Drop(s) Left EYE three times a day  furosemide    Tablet 20 milliGRAM(s) Oral daily  heparin  Injectable 5000 Unit(s) SubCutaneous every 12 hours  timolol 0.5% Solution 1 Drop(s) Left EYE daily    MEDICATIONS  (PRN):  acetaminophen   Tablet .. 325 milliGRAM(s) Oral every 6 hours PRN Mild Pain (1 - 3), Moderate Pain (4 - 6), Severe Pain (7 - 10)  HYDROmorphone  Injectable 0.5 milliGRAM(s) IV Push every 3 hours PRN Moderate to severe pain      Objective:    Vitals: Vital Signs Last 24 Hrs  T(C): 36.4 (01-10-19 @ 06:07), Max: 36.4 (01-10-19 @ 06:07)  T(F): 97.6 (01-10-19 @ 06:07), Max: 97.6 (01-10-19 @ 06:07)  HR: 89 (01-10-19 @ 06:07) (81 - 89)  BP: 126/81 (01-10-19 @ 06:07) (100/60 - 126/81)  BP(mean): --  RR: 16 (01-10-19 @ 06:07) (16 - 16)  SpO2: 100% (01-10-19 @ 06:07) (100% - 100%)            I&O's Summary    09 Jan 2019 07:01  -  10 Murtaza 2019 07:00  --------------------------------------------------------  IN: 440 mL / OUT: 600 mL / NET: -160 mL    10 Murtaza 2019 07:01  -  10 Murtaza 2019 09:09  --------------------------------------------------------  IN: 0 mL / OUT: 50 mL / NET: -50 mL        PHYSICAL EXAM:  GENERAL: Elderly woman, very minimally responsive to questions  HEAD:  Poor dentition  EYES: Conjunctiva and sclera clear  CHEST/LUNG: Clear to auscultation bilaterally; No rales, rhonchi, wheezing, or rubs  HEART: Regular rate and rhythm; No murmurs, rubs, or gallops.  ABDOMEN: Soft, Nontender, Nondistended, BS+   SKIN: Dry appearing  NERVOUS SYSTEM:  Alert & Oriented X0, unable to further assess due to patient mental status    LABS:  01-10    137  |  98  |  22  ----------------------------<  109<H>  5.0   |  28  |  1.32<H>  01-09    129<L>  |  99  |  18  ----------------------------<  83  Test not performed SPECIMEN GROSSLY HEMOLYZED   |  27  |  1.04  01-08    137  |  100  |  20  ----------------------------<  90  4.6   |  27  |  1.40<H>    Ca    9.7      10 Murtaza 2019 06:03  Ca    8.7      09 Jan 2019 05:25  Ca    9.1      08 Jan 2019 05:45  Phos  4.1     01-10  Mg     2.2     01-10    TPro  Test not performed SPECIMEN GROSSLY HEMOLYZED  /  Alb  2.9<L>  /  TBili  0.6  /  DBili  x   /  AST  Test not performed SPECIMEN GROSSLY HEMOLYZED.  /  ALT  Test not performed SPECIMEN GROSSLY HEMOLYZED.  /  AlkPhos  286<H>  01-09  TPro  7.0  /  Alb  3.1<L>  /  TBili  0.4  /  DBili  x   /  AST  72<H>  /  ALT  94<H>  /  AlkPhos  383<H>  01-08                                              10.0   5.40  )-----------( 336      ( 08 Jan 2019 05:45 )             32.0     CAPILLARY BLOOD GLUCOSE          RADIOLOGY & ADDITIONAL TESTS:    Imaging Personally Reviewed:  [x ] YES  [ ] NO    Consultants involved in case:   Consultant(s) Notes Reviewed:  [ x] YES  [ ] NO:   Care Discussed with Consultants/Other Providers [x ] YES  [ ] NO

## 2019-01-10 NOTE — PROGRESS NOTE ADULT - ATTENDING COMMENTS
Patient seen and examined.  Clinically stable, no new changes, has random no localizing pains that come and go, likely in setting CVA and confusion.      Advanced Care Planning:  Met with HCP Lucía, son Atul, Daughter Charis, and Carla's daughter with [facility] re: overall plans for treatment and end of life choices.  It was explained that she is having clinical decline in last month, previously functional prior to first stroke.  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  Explained that given current MS and underlying conditions prognosis is not great and she is unlikely to return to her prior baseline.    Family overall goal is for comfort.  Prefer to take her home over sending her back to rehab as they found she didn't want to participate there and was miserable.  Family would be interested in hospice.  35 min spent with family during this discussion.

## 2019-01-10 NOTE — PROGRESS NOTE ADULT - PROBLEM SELECTOR PLAN 1
Patient with occasional episodes of pain evidenced by grimacing, continue Dilaudid 0.2mg q3h PRN. When ready for discharge would recommend Dilaudid 1mg solution q3h PRN. Patient with occasional episodes of pain evidenced by grimacing, continue Dilaudid 0.2mg q3h PRN. Would recommend it pt is tolerating oral, would transition to Dilaudid 1mg PO q3hrs PRN, and to continue on discharge.

## 2019-01-10 NOTE — PROGRESS NOTE ADULT - PROBLEM SELECTOR PLAN 2
Patient with multiple CVAs, is currently non verbal and unable to make her needs known. Appreciate neurology involvement. Family interesting in comfort care. Hospice was discussed. Patient with multiple CVAs, clinical picture of vascular dementia, currently non verbal and unable to make her needs known. Appreciate neurology involvement. Family interesting in comfort care. Hospice was discussed. Hospice referral made. Plan would be for pt to be discharged home with hospice services.

## 2019-01-11 DIAGNOSIS — N18.9 CHRONIC KIDNEY DISEASE, UNSPECIFIED: ICD-10-CM

## 2019-01-11 PROCEDURE — 99232 SBSQ HOSP IP/OBS MODERATE 35: CPT | Mod: GC

## 2019-01-11 RX ADMIN — Medication 100 MILLIGRAM(S): at 21:51

## 2019-01-11 RX ADMIN — DORZOLAMIDE HYDROCHLORIDE 1 DROP(S): 20 SOLUTION/ DROPS OPHTHALMIC at 05:08

## 2019-01-11 RX ADMIN — BRIMONIDINE TARTRATE 1 DROP(S): 2 SOLUTION/ DROPS OPHTHALMIC at 13:03

## 2019-01-11 RX ADMIN — HEPARIN SODIUM 5000 UNIT(S): 5000 INJECTION INTRAVENOUS; SUBCUTANEOUS at 17:14

## 2019-01-11 RX ADMIN — HEPARIN SODIUM 5000 UNIT(S): 5000 INJECTION INTRAVENOUS; SUBCUTANEOUS at 05:08

## 2019-01-11 RX ADMIN — DORZOLAMIDE HYDROCHLORIDE 1 DROP(S): 20 SOLUTION/ DROPS OPHTHALMIC at 13:04

## 2019-01-11 RX ADMIN — HYDROMORPHONE HYDROCHLORIDE 1 MILLIGRAM(S): 2 INJECTION INTRAMUSCULAR; INTRAVENOUS; SUBCUTANEOUS at 05:08

## 2019-01-11 RX ADMIN — Medication 20 MILLIGRAM(S): at 05:08

## 2019-01-11 RX ADMIN — Medication 100 MILLIGRAM(S): at 05:08

## 2019-01-11 RX ADMIN — SENNA PLUS 2 TABLET(S): 8.6 TABLET ORAL at 21:51

## 2019-01-11 RX ADMIN — Medication 1 DROP(S): at 13:04

## 2019-01-11 RX ADMIN — DORZOLAMIDE HYDROCHLORIDE 1 DROP(S): 20 SOLUTION/ DROPS OPHTHALMIC at 21:51

## 2019-01-11 RX ADMIN — HYDROMORPHONE HYDROCHLORIDE 1 MILLIGRAM(S): 2 INJECTION INTRAMUSCULAR; INTRAVENOUS; SUBCUTANEOUS at 06:01

## 2019-01-11 RX ADMIN — Medication 81 MILLIGRAM(S): at 17:14

## 2019-01-11 RX ADMIN — Medication 100 MILLIGRAM(S): at 13:03

## 2019-01-11 NOTE — PROGRESS NOTE ADULT - SUBJECTIVE AND OBJECTIVE BOX
***************************************************************  Sharmila Bales, PGY1  Internal Medicine   pager [phone]  ***************************************************************    HONEY MOREL  85y  MRN: [id number]    Patient is a 85y old woman admitted for encephalopathy.     Subjective: Patient was seen and examined at the bedside. There were no acute events overnight. She is AAO x0. Her daughter was at bedside, reported patient had a BM last night.       MEDICATIONS  (STANDING):  aspirin enteric coated 81 milliGRAM(s) Oral daily  brimonidine 0.2% Ophthalmic Solution 1 Drop(s) Left EYE daily  docusate sodium 100 milliGRAM(s) Oral three times a day  dorzolamide 2% Ophthalmic Solution 1 Drop(s) Left EYE three times a day  furosemide    Tablet 20 milliGRAM(s) Oral daily  heparin  Injectable 5000 Unit(s) SubCutaneous every 12 hours  polyethylene glycol 3350 17 Gram(s) Oral two times a day  senna 2 Tablet(s) Oral at bedtime  timolol 0.5% Solution 1 Drop(s) Left EYE daily    MEDICATIONS  (PRN):  acetaminophen   Tablet .. 325 milliGRAM(s) Oral every 6 hours PRN Mild Pain (1 - 3)  HYDROmorphone   Tablet 1 milliGRAM(s) Oral every 4 hours PRN Moderate and severe pain      Objective:    Vitals: Vital Signs Last 24 Hrs  T(C): 36.3 (01-11-19 @ 05:30), Max: 36.3 (01-10-19 @ 20:20)  T(F): 97.4 (01-11-19 @ 05:30), Max: 97.4 (01-10-19 @ 20:20)  HR: 75 (01-11-19 @ 05:30) (71 - 79)  BP: 104/64 (01-11-19 @ 05:30) (104/60 - 116/61)  BP(mean): --  RR: 16 (01-11-19 @ 05:30) (16 - 16)  SpO2: 100% (01-11-19 @ 05:30) (100% - 100%)            I&O's Summary    10 Murtaza 2019 07:01  -  11 Jan 2019 07:00  --------------------------------------------------------  IN: 320 mL / OUT: 1050 mL / NET: -730 mL        PHYSICAL EXAM:  GENERAL: Elderly woman, resting in bed  HEAD:  Poor dentition  EYES: Conjunctiva and sclera clear  CHEST/LUNG: Clear to auscultation bilaterally; No rales, rhonchi, wheezing, or rubs  HEART: Regular rate and rhythm; No murmurs, rubs, or gallops.  ABDOMEN: Soft, Nontender, Nondistended, BS+   SKIN: Dry appearing  NERVOUS SYSTEM:  Alert & Oriented X0, unable to further assess due to patient mental status      LABS:  01-10    137  |  98  |  22  ----------------------------<  109<H>  5.0   |  28  |  1.32<H>  01-09    129<L>  |  99  |  18  ----------------------------<  83  Test not performed SPECIMEN GROSSLY HEMOLYZED   |  27  |  1.04    Ca    9.7      10 Murtaza 2019 06:03  Ca    8.7      09 Jan 2019 05:25  Phos  4.1     01-10  Mg     2.2     01-10    TPro  Test not performed SPECIMEN GROSSLY HEMOLYZED  /  Alb  2.9<L>  /  TBili  0.6  /  DBili  x   /  AST  Test not performed SPECIMEN GROSSLY HEMOLYZED.  /  ALT  Test not performed SPECIMEN GROSSLY HEMOLYZED.  /  AlkPhos  286<H>  01-09                          CAPILLARY BLOOD GLUCOSE          RADIOLOGY & ADDITIONAL TESTS:    Imaging Personally Reviewed:  [x ] YES  [ ] NO    Consultants involved in case:   Consultant(s) Notes Reviewed:  [ x] YES  [ ] NO:   Care Discussed with Consultants/Other Providers [x ] YES  [ ] NO ***************************************************************  Sharmila Bales, PGY1  Internal Medicine   pager [phone]  ***************************************************************    HONEY MOREL  85y  MRN: [id number]    Patient is a 85y old woman admitted for encephalopathy.     Subjective: Patient was seen and examined at the bedside. There were no acute events overnight. She is AAO x0. Her daughter was at bedside, reported patient had a BM last night.     MEDICATIONS  (STANDING):  aspirin enteric coated 81 milliGRAM(s) Oral daily  brimonidine 0.2% Ophthalmic Solution 1 Drop(s) Left EYE daily  docusate sodium 100 milliGRAM(s) Oral three times a day  dorzolamide 2% Ophthalmic Solution 1 Drop(s) Left EYE three times a day  furosemide    Tablet 20 milliGRAM(s) Oral daily  heparin  Injectable 5000 Unit(s) SubCutaneous every 12 hours  polyethylene glycol 3350 17 Gram(s) Oral two times a day  senna 2 Tablet(s) Oral at bedtime  timolol 0.5% Solution 1 Drop(s) Left EYE daily    MEDICATIONS  (PRN):  acetaminophen   Tablet .. 325 milliGRAM(s) Oral every 6 hours PRN Mild Pain (1 - 3)  HYDROmorphone   Tablet 1 milliGRAM(s) Oral every 4 hours PRN Moderate and severe pain    Objective:  Vitals: Vital Signs Last 24 Hrs  T(C): 36.3 (01-11-19 @ 05:30), Max: 36.3 (01-10-19 @ 20:20)  T(F): 97.4 (01-11-19 @ 05:30), Max: 97.4 (01-10-19 @ 20:20)  HR: 75 (01-11-19 @ 05:30) (71 - 79)  BP: 104/64 (01-11-19 @ 05:30) (104/60 - 116/61)  BP(mean): --  RR: 16 (01-11-19 @ 05:30) (16 - 16)  SpO2: 100% (01-11-19 @ 05:30) (100% - 100%)            I&O's Summary    10 Murtaza 2019 07:01  -  11 Jan 2019 07:00  --------------------------------------------------------  IN: 320 mL / OUT: 1050 mL / NET: -730 mL      PHYSICAL EXAM:  GENERAL: Elderly woman, resting in bed  HEAD:  Poor dentition  EYES: Conjunctiva and sclera clear  CHEST/LUNG: Clear to auscultation bilaterally; No rales, rhonchi, wheezing, or rubs  HEART: Regular rate and rhythm; No murmurs, rubs, or gallops.  ABDOMEN: Soft, Nontender, Nondistended, BS+   SKIN: Dry appearing  NERVOUS SYSTEM:  Alert & Oriented X0, unable to further assess due to patient mental status; moves all extremities, follows basic commands at times       LABS:  01-10    137  |  98  |  22  ----------------------------<  109<H>  5.0   |  28  |  1.32<H>  01-09    129<L>  |  99  |  18  ----------------------------<  83  Test not performed SPECIMEN GROSSLY HEMOLYZED   |  27  |  1.04    Ca    9.7      10 Murtaza 2019 06:03  Ca    8.7      09 Jan 2019 05:25  Phos  4.1     01-10  Mg     2.2     01-10    TPro  Test not performed SPECIMEN GROSSLY HEMOLYZED  /  Alb  2.9<L>  /  TBili  0.6  /  DBili  x   /  AST  Test not performed SPECIMEN GROSSLY HEMOLYZED.  /  ALT  Test not performed SPECIMEN GROSSLY HEMOLYZED.  /  AlkPhos  286<H>  01-09      Imaging Personally Reviewed:  [x ] YES  [ ] NO    TTE (1/10):   1. Mitral annular calcification and calcified mitral  leaflets with normal diastolic opening.  2. Calcified trileaflet aortic valve with normal opening.  Mobile about 1 cm filament on non-coronary or left cusp  that may represent Lambl's excrescence (normal variant) vs  other. Correlate clinically.  3. Severely dilated left atrium.  LA volume index = 55  cc/m2.  4. Severe concentric left ventricular hypertrophy. 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  5. Mild global left ventricular systolic dysfunction.  6. Increased E/e'  is consistent with elevated left  ventricular filling pressure.  7. Normal right ventricular function and significant RV  hypertrophy.  8. Normal pericardium with trace pericardial effusion.    Consultants involved in case:   Consultant(s) Notes Reviewed:  [ x] YES  [ ] NO:   Care Discussed with Consultants/Other Providers [x ] YES  [ ] NO

## 2019-01-11 NOTE — PROGRESS NOTE ADULT - ASSESSMENT
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 GOC with family, patient awaiting home hospice, DNR/DNI. 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  HF resolved.       GOC with family, patient awaiting home hospice, DNR/DNI.

## 2019-01-11 NOTE — PROGRESS NOTE ADULT - PROBLEM SELECTOR PLAN 1
-likely 2/2 residual deficits from CVA December 2018 and acute infarcts in the b/l centrum semiovale ovale.  -differential included infectious vs. new CVA, UA negative, MRI with old (subacute) left parietal infarct, acute watershed like infarcts in b/l centrum semiovale ovale  -collateral information from family suggests patient is at new baseline post CVA  -neurology evaluated, suggest c/w ASA, permissive HTN, underlying pathology r/o cardiogenic vs neoplastic, however patient with poor prognosis  -TTE with LA dilation, concentric LVH suggestive of amyloidosis 2/2 residual deficits from CVA December 2018 and new acute infarcts in the b/l centrum semiovale ovale; differential included infectious vs. new CVA, UA negative, MRI with old (subacute) left parietal infarct, acute watershed like infarcts in b/l centrum semiovale ovale  -collateral information from family suggests patient is at new baseline post CVA  -neurology evaluated, suggest c/w ASA, permissive HTN, underlying pathology r/o cardiogenic vs neoplastic, however patient with poor prognosis  -TTE with LA dilation, concentric LVH suggestive of amyloidosis

## 2019-01-11 NOTE — PROGRESS NOTE ADULT - PROBLEM SELECTOR PLAN 6
VTE: Heparin subcu q12h  GoC: pt awaiting home hospice, MOLST in chart, DNR/DNI  Dispo: home with hospice

## 2019-01-11 NOTE — PROGRESS NOTE ADULT - PROBLEM SELECTOR PLAN 5
with recent tylenol use for headaches and on statin unclear when initiated, may also be 2/2 HF and hepatic congestion  hold statin  hepatitis panel negative  may c/w acetaminophen carefully for pain (maintain < 4g per day)  c/w dilaudid 1mg PO PRN

## 2019-01-11 NOTE — PROGRESS NOTE ADULT - PROBLEM SELECTOR PLAN 4
-Creatinine elevated, no hx of NSAID use; prior 1.13 in copies in medical chart  -avoid nephrotoxins  -renal dosing  -holding labs, patient awaiting home hospice -Cr 1.3-1.4, GFR approx 37  -CKD Stage 3  -avoid nephrotoxins  -renal dosing

## 2019-01-11 NOTE — PROGRESS NOTE ADULT - PROBLEM SELECTOR PLAN 2
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

## 2019-01-11 NOTE — PROGRESS NOTE ADULT - ATTENDING COMMENTS
Patient seen and examined.      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  Family meeting on 1/10.  Family no longer interested in [facility], want to focus on comfort, interested in home hospice.  Being arranged, needs equipment. Patient seen and examined.      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  Family meeting on 1/10.  Family no longer interested in [facility], want to focus on comfort, interested in home hospice.  Being arranged, needs equipment.    dispo planning initiated, SW/CM aware  hospice team involved and assisting with home equipment

## 2019-01-11 NOTE — PROGRESS NOTE ADULT - PROBLEM SELECTOR PLAN 3
-recent stroke and evolving lesion seen on head CT with continued MS changes  MRI: subacute left parietal infarct, and few tiny scattered   acute infarcts in the bilateral centrum semiovale ovale.  -speech and swallow: diet is mechanical soft with thin liquids  -PT/OT recommending rehab, social work/ case management on board

## 2019-01-12 LAB — GAS PNL BLDMV: SIGNIFICANT CHANGE UP

## 2019-01-12 PROCEDURE — 99232 SBSQ HOSP IP/OBS MODERATE 35: CPT | Mod: GC

## 2019-01-12 RX ORDER — FUROSEMIDE 40 MG
20 TABLET ORAL DAILY
Qty: 0 | Refills: 0 | Status: DISCONTINUED | OUTPATIENT
Start: 2019-01-14 | End: 2019-01-14

## 2019-01-12 RX ADMIN — Medication 1 DROP(S): at 13:17

## 2019-01-12 RX ADMIN — POLYETHYLENE GLYCOL 3350 17 GRAM(S): 17 POWDER, FOR SOLUTION ORAL at 06:53

## 2019-01-12 RX ADMIN — Medication 20 MILLIGRAM(S): at 06:54

## 2019-01-12 RX ADMIN — Medication 100 MILLIGRAM(S): at 13:17

## 2019-01-12 RX ADMIN — DORZOLAMIDE HYDROCHLORIDE 1 DROP(S): 20 SOLUTION/ DROPS OPHTHALMIC at 06:54

## 2019-01-12 RX ADMIN — HEPARIN SODIUM 5000 UNIT(S): 5000 INJECTION INTRAVENOUS; SUBCUTANEOUS at 06:53

## 2019-01-12 RX ADMIN — DORZOLAMIDE HYDROCHLORIDE 1 DROP(S): 20 SOLUTION/ DROPS OPHTHALMIC at 13:17

## 2019-01-12 RX ADMIN — DORZOLAMIDE HYDROCHLORIDE 1 DROP(S): 20 SOLUTION/ DROPS OPHTHALMIC at 21:40

## 2019-01-12 RX ADMIN — Medication 100 MILLIGRAM(S): at 06:54

## 2019-01-12 RX ADMIN — BRIMONIDINE TARTRATE 1 DROP(S): 2 SOLUTION/ DROPS OPHTHALMIC at 13:17

## 2019-01-12 RX ADMIN — Medication 81 MILLIGRAM(S): at 21:41

## 2019-01-12 NOTE — PROGRESS NOTE ADULT - PROBLEM SELECTOR PLAN 2
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 EF=40-45%, mild interstitial edema on cxr, patient now clinically improved, dry appearing, no LE edema  -elevated troponin in setting of demand ischemia/hypoxia overload  -I/Os, murray in place  -c/w lasix 20 po daily   -pt also with 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

## 2019-01-12 NOTE — PROGRESS NOTE ADULT - PROBLEM SELECTOR PLAN 6
VTE: Heparin subcu q12h  GoC: pt awaiting home hospice, MOLST in chart, DNR/DNI  Dispo: home with hospice VTE: Heparin subcu q12h  GoC: pt awaiting home hospice, MOLST in chart, DNR/DNI  Dispo: home with hospice pending home equipment.

## 2019-01-12 NOTE — PROGRESS NOTE ADULT - SUBJECTIVE AND OBJECTIVE BOX
***************************************************************  Dr.Benziger Colon   Internal Medicine   PGY-2   [phone] (long range pager)  [phone] (short range)  ***************************************************************    HONEY MOREL  85y  MRN: [id number]    Patient is a 85y old woman admitted for encephalopathy.     Subjective: Patient was seen and examined at the bedside. There were no acute events overnight. She is AAO x0. Her daughter was at bedside, reported patient had a BM last night.     MEDICATIONS  (STANDING):  aspirin enteric coated 81 milliGRAM(s) Oral daily  brimonidine 0.2% Ophthalmic Solution 1 Drop(s) Left EYE daily  docusate sodium 100 milliGRAM(s) Oral three times a day  dorzolamide 2% Ophthalmic Solution 1 Drop(s) Left EYE three times a day  furosemide    Tablet 20 milliGRAM(s) Oral daily  heparin  Injectable 5000 Unit(s) SubCutaneous every 12 hours  polyethylene glycol 3350 17 Gram(s) Oral two times a day  senna 2 Tablet(s) Oral at bedtime  timolol 0.5% Solution 1 Drop(s) Left EYE daily    MEDICATIONS  (PRN):  acetaminophen   Tablet .. 325 milliGRAM(s) Oral every 6 hours PRN Mild Pain (1 - 3)  HYDROmorphone   Tablet 1 milliGRAM(s) Oral every 4 hours PRN Moderate and severe pain    Objective:  Vitals: Vital Signs Last 24 Hrs  T(C): 36.3 (01-11-19 @ 05:30), Max: 36.3 (01-10-19 @ 20:20)  T(F): 97.4 (01-11-19 @ 05:30), Max: 97.4 (01-10-19 @ 20:20)  HR: 75 (01-11-19 @ 05:30) (71 - 79)  BP: 104/64 (01-11-19 @ 05:30) (104/60 - 116/61)  BP(mean): --  RR: 16 (01-11-19 @ 05:30) (16 - 16)  SpO2: 100% (01-11-19 @ 05:30) (100% - 100%)            I&O's Summary    10 Murtaza 2019 07:01  -  11 Jan 2019 07:00  --------------------------------------------------------  IN: 320 mL / OUT: 1050 mL / NET: -730 mL      PHYSICAL EXAM:  GENERAL: Elderly woman, resting in bed  HEAD:  Poor dentition  EYES: Conjunctiva and sclera clear  CHEST/LUNG: Clear to auscultation bilaterally; No rales, rhonchi, wheezing, or rubs  HEART: Regular rate and rhythm; No murmurs, rubs, or gallops.  ABDOMEN: Soft, Nontender, Nondistended, BS+   SKIN: Dry appearing  NERVOUS SYSTEM:  Alert & Oriented X0, unable to further assess due to patient mental status; moves all extremities, follows basic commands at times       LABS:  01-10    137  |  98  |  22  ----------------------------<  109<H>  5.0   |  28  |  1.32<H>  01-09    129<L>  |  99  |  18  ----------------------------<  83  Test not performed SPECIMEN GROSSLY HEMOLYZED   |  27  |  1.04    Ca    9.7      10 Murtaza 2019 06:03  Ca    8.7      09 Jan 2019 05:25  Phos  4.1     01-10  Mg     2.2     01-10    TPro  Test not performed SPECIMEN GROSSLY HEMOLYZED  /  Alb  2.9<L>  /  TBili  0.6  /  DBili  x   /  AST  Test not performed SPECIMEN GROSSLY HEMOLYZED.  /  ALT  Test not performed SPECIMEN GROSSLY HEMOLYZED.  /  AlkPhos  286<H>  01-09      Imaging Personally Reviewed:  [x ] YES  [ ] NO    TTE (1/10):   1. Mitral annular calcification and calcified mitral  leaflets with normal diastolic opening.  2. Calcified trileaflet aortic valve with normal opening.  Mobile about 1 cm filament on non-coronary or left cusp  that may represent Lambl's excrescence (normal variant) vs  other. Correlate clinically.  3. Severely dilated left atrium.  LA volume index = 55  cc/m2.  4. Severe concentric left ventricular hypertrophy. 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  5. Mild global left ventricular systolic dysfunction.  6. Increased E/e'  is consistent with elevated left  ventricular filling pressure.  7. Normal right ventricular function and significant RV  hypertrophy.  8. Normal pericardium with trace pericardial effusion.    Consultants involved in case:   Consultant(s) Notes Reviewed:  [ x] YES  [ ] NO:   Care Discussed with Consultants/Other Providers [x ] YES  [ ] NO ***************************************************************  Dr.Benziger Colon   Internal Medicine   PGY-2   [phone] (long range pager)  [phone] (short range)  ***************************************************************    HONEY MOREL  85y  MRN: [id number]    Patient is a 85y old woman admitted for encephalopathy.     Subjective: Patient was seen and examined at the bedside. There were no acute events overnight. She is AAO x0. Her son is at bedside is AM, reported patient had a BM last night. Zflow boots off  of right foot on arrival, placed back on.      MEDICATIONS  (STANDING):  aspirin enteric coated 81 milliGRAM(s) Oral daily  brimonidine 0.2% Ophthalmic Solution 1 Drop(s) Left EYE daily  docusate sodium 100 milliGRAM(s) Oral three times a day  dorzolamide 2% Ophthalmic Solution 1 Drop(s) Left EYE three times a day  furosemide    Tablet 20 milliGRAM(s) Oral daily  heparin  Injectable 5000 Unit(s) SubCutaneous every 12 hours  polyethylene glycol 3350 17 Gram(s) Oral two times a day  senna 2 Tablet(s) Oral at bedtime  timolol 0.5% Solution 1 Drop(s) Left EYE daily    MEDICATIONS  (PRN):  acetaminophen   Tablet .. 325 milliGRAM(s) Oral every 6 hours PRN Mild Pain (1 - 3)  HYDROmorphone   Tablet 1 milliGRAM(s) Oral every 4 hours PRN Moderate and severe pain    Objective:  T(C): 36.4 (01-12-19 @ 06:51), Max: 36.6 (01-11-19 @ 21:43)  HR: 93 (01-12-19 @ 06:51) (80 - 93)  BP: 91/50 (01-12-19 @ 06:51) (91/50 - 94/60)  RR: 16 (01-12-19 @ 06:51) (16 - 16)  SpO2: 100% (01-12-19 @ 06:51) (100% - 100%)  Wt(kg): --  01-11 @ 07:01  -  01-12 @ 07:00  --------------------------------------------------------  IN:  Total IN: 0 mL    OUT:    Indwelling Catheter - Urethral: 600 mL  Total OUT: 600 mL    Total NET: -600 mL              PHYSICAL EXAM:  GENERAL: Elderly woman, resting in bed  HEAD:  Poor dentition  EYES: Conjunctiva and sclera clear  CHEST/LUNG: Clear to auscultation bilaterally; No rales, rhonchi, wheezing, or rubs  HEART: Regular rate and rhythm; No murmurs, rubs, or gallops.  ABDOMEN: Soft, Nontender, Nondistended, BS+   SKIN: Dry appearing  NERVOUS SYSTEM:  Alert but Oriented X0, unable to further assess due to patient mental status; moves all extremities, follows basic commands at times       LABS:    Lab holiday    Imaging Personally Reviewed:  [x ] YES  [ ] NO    TTE (1/10):   1. Mitral annular calcification and calcified mitral  leaflets with normal diastolic opening.  2. Calcified trileaflet aortic valve with normal opening.  Mobile about 1 cm filament on non-coronary or left cusp  that may represent Lambl's excrescence (normal variant) vs  other. Correlate clinically.  3. Severely dilated left atrium.  LA volume index = 55  cc/m2.  4. Severe concentric left ventricular hypertrophy. 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  5. Mild global left ventricular systolic dysfunction.  6. Increased E/e'  is consistent with elevated left  ventricular filling pressure.  7. Normal right ventricular function and significant RV  hypertrophy.  8. Normal pericardium with trace pericardial effusion.    Consultants involved in case:   Consultant(s) Notes Reviewed:  [ x] YES  [ ] NO:   Care Discussed with Consultants/Other Providers [x ] YES  [ ] NO

## 2019-01-12 NOTE — PROGRESS NOTE ADULT - PROBLEM SELECTOR PLAN 5
with recent tylenol use for headaches and on statin unclear when initiated, may also be 2/2 HF and hepatic congestion  hold statin  hepatitis panel negative  may c/w acetaminophen carefully for pain (maintain < 4g per day)  c/w dilaudid 1mg PO PRN with recent Tylenol use for headaches and on statin unclear when initiated, may also be 2/2 HF and hepatic congestion  hold statin  hepatitis panel negative  may c/w acetaminophen carefully for pain (maintain < 4g per day)  c/w dilaudid 1mg PO PRN

## 2019-01-12 NOTE — PROGRESS NOTE ADULT - PROBLEM SELECTOR PLAN 3
-recent stroke and evolving lesion seen on head CT with continued MS changes  MRI: subacute left parietal infarct, and few tiny scattered   acute infarcts in the bilateral centrum semiovale ovale.  -speech and swallow: diet is mechanical soft with thin liquids  -PT/OT recommending rehab, social work/ case management on board -recent stroke and evolving lesion seen on head CT with continued MS changes  MRI: subacute left parietal infarct, and few tiny scattered   acute infarcts in the bilateral centrum semiovale ovale.  -speech and swallow: diet is mechanical soft with thin liquids  -PT/OT recommending rehab however patient will go home to home hospice as per family preference, social work/ case management on board

## 2019-01-12 NOTE — PROVIDER CONTACT NOTE (OTHER) - ACTION/TREATMENT ORDERED:
following hypoglycemia protocol
Pt admit to floor from ED will follow new orders when placed
Notified hs2

## 2019-01-12 NOTE — PROGRESS NOTE ADULT - ASSESSMENT
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  HF resolved.       GOC with family, patient awaiting home hospice, DNR/DNI.

## 2019-01-12 NOTE — PROGRESS NOTE ADULT - PROBLEM SELECTOR PLAN 1
2/2 residual deficits from CVA December 2018 and new acute infarcts in the b/l centrum semiovale ovale; differential included infectious vs. new CVA, UA negative, MRI with old (subacute) left parietal infarct, acute watershed like infarcts in b/l centrum semiovale ovale  -collateral information from family suggests patient is at new baseline post CVA  -neurology evaluated, suggest c/w ASA, permissive HTN, underlying pathology r/o cardiogenic vs neoplastic, however patient with poor prognosis  -TTE with LA dilation, concentric LVH suggestive of amyloidosis

## 2019-01-12 NOTE — PROGRESS NOTE ADULT - ATTENDING COMMENTS
Patient seen and examined.      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  Family meeting on 1/10.  Family no longer interested in [facility], want to focus on comfort, interested in home hospice.  Being arranged, needs equipment planned for monday.   d/w family TOV, state rather leave murray in for comfort as they state she failed last time and don't want to go through that process again   dispo planning initiated, SW/CM aware  hospice team involved and assisting with home equipment

## 2019-01-13 PROCEDURE — 99232 SBSQ HOSP IP/OBS MODERATE 35: CPT | Mod: GC

## 2019-01-13 RX ADMIN — Medication 100 MILLIGRAM(S): at 12:39

## 2019-01-13 RX ADMIN — Medication 100 MILLIGRAM(S): at 22:22

## 2019-01-13 RX ADMIN — Medication 81 MILLIGRAM(S): at 22:22

## 2019-01-13 RX ADMIN — DORZOLAMIDE HYDROCHLORIDE 1 DROP(S): 20 SOLUTION/ DROPS OPHTHALMIC at 22:22

## 2019-01-13 RX ADMIN — DORZOLAMIDE HYDROCHLORIDE 1 DROP(S): 20 SOLUTION/ DROPS OPHTHALMIC at 12:39

## 2019-01-13 RX ADMIN — DORZOLAMIDE HYDROCHLORIDE 1 DROP(S): 20 SOLUTION/ DROPS OPHTHALMIC at 06:30

## 2019-01-13 RX ADMIN — HEPARIN SODIUM 5000 UNIT(S): 5000 INJECTION INTRAVENOUS; SUBCUTANEOUS at 06:29

## 2019-01-13 RX ADMIN — BRIMONIDINE TARTRATE 1 DROP(S): 2 SOLUTION/ DROPS OPHTHALMIC at 12:39

## 2019-01-13 RX ADMIN — Medication 1 DROP(S): at 12:39

## 2019-01-13 NOTE — PROGRESS NOTE ADULT - PROBLEM SELECTOR PLAN 2
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

## 2019-01-13 NOTE — PROGRESS NOTE ADULT - ASSESSMENT
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  HF resolved. GOC with family, patient awaiting home hospice, DNR/DNI.

## 2019-01-13 NOTE — PROGRESS NOTE ADULT - ATTENDING COMMENTS
Patient seen and examined.      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  Family meeting on 1/10.  Family no longer interested in [facility], want to focus on comfort, interested in home hospice.  Being arranged, needs equipment planned for monday.   d/w family TOV, state rather leave murray in for comfort as they state she failed last time and don't want to go through that process again   dispo planning initiated, SW/CM aware  hospice team involved and assisting with home equipment  home tomorrow vs tuesday pending equipment

## 2019-01-13 NOTE — PROGRESS NOTE ADULT - SUBJECTIVE AND OBJECTIVE BOX
***************************************************************  Sharmila Bales, PGY1  Internal Medicine   pager [phone]  ***************************************************************    HONEY MOREL  85y  MRN: [id number]    Patient is a 85y old woman admitted for encephalopathy.     Subjective: Patient was seen and examined at the bedside. There were no acute events overnight. She is AAO x0. Her daughter was at bedside is AM.     MEDICATIONS  (STANDING):  aspirin enteric coated 81 milliGRAM(s) Oral daily  brimonidine 0.2% Ophthalmic Solution 1 Drop(s) Left EYE daily  docusate sodium 100 milliGRAM(s) Oral three times a day  dorzolamide 2% Ophthalmic Solution 1 Drop(s) Left EYE three times a day  heparin  Injectable 5000 Unit(s) SubCutaneous every 12 hours  polyethylene glycol 3350 17 Gram(s) Oral two times a day  senna 2 Tablet(s) Oral at bedtime  timolol 0.5% Solution 1 Drop(s) Left EYE daily    MEDICATIONS  (PRN):  acetaminophen   Tablet .. 325 milliGRAM(s) Oral every 6 hours PRN Mild Pain (1 - 3)  HYDROmorphone   Tablet 1 milliGRAM(s) Oral every 4 hours PRN Moderate and severe pain      Objective:    Vitals: Vital Signs Last 24 Hrs  T(C): 36.8 (01-13-19 @ 06:27), Max: 37.1 (01-12-19 @ 21:37)  T(F): 98.3 (01-13-19 @ 06:27), Max: 98.8 (01-12-19 @ 21:37)  HR: 82 (01-13-19 @ 06:27) (60 - 93)  BP: 106/65 (01-13-19 @ 06:27) (87/47 - 119/70)  BP(mean): --  RR: 16 (01-13-19 @ 06:27) (16 - 16)  SpO2: 100% (01-13-19 @ 06:27) (100% - 100%)            I&O's Summary    12 Jan 2019 07:01  -  13 Jan 2019 07:00  --------------------------------------------------------  IN: 0 mL / OUT: 300 mL / NET: -300 mL    13 Jan 2019 07:01  -  13 Jan 2019 09:01  --------------------------------------------------------  IN: 0 mL / OUT: 100 mL / NET: -100 mL        PHYSICAL EXAM:  GENERAL: Elderly woman, resting in bed  HEAD:  Poor dentition  EYES: Conjunctiva and sclera clear  CHEST/LUNG: Clear to auscultation bilaterally; No rales, rhonchi, wheezing, or rubs  HEART: Regular rate and rhythm; No murmurs, rubs, or gallops.  ABDOMEN: Soft, Nontender, Nondistended, BS+   SKIN: Dry appearing  NERVOUS SYSTEM:  Alert & Oriented X0, unable to further assess due to patient mental status, follows some commands    LABS:                              CAPILLARY BLOOD GLUCOSE          RADIOLOGY & ADDITIONAL TESTS:    Imaging Personally Reviewed:  [x ] YES  [ ] NO    Consultants involved in case:   Consultant(s) Notes Reviewed:  [ x] YES  [ ] NO:   Care Discussed with Consultants/Other Providers [x ] YES  [ ] NO ***************************************************************  Sharmila Bales, PGY1  Internal Medicine   pager [phone]  ***************************************************************    HONEY MOREL  85y  MRN: [id number]    Patient is a 85y old woman admitted for encephalopathy.     Subjective: Patient was seen and examined at the bedside. There were no acute events overnight. She is AAO x0. Her daughter was at bedside is AM.     MEDICATIONS  (STANDING):  aspirin enteric coated 81 milliGRAM(s) Oral daily  brimonidine 0.2% Ophthalmic Solution 1 Drop(s) Left EYE daily  docusate sodium 100 milliGRAM(s) Oral three times a day  dorzolamide 2% Ophthalmic Solution 1 Drop(s) Left EYE three times a day  heparin  Injectable 5000 Unit(s) SubCutaneous every 12 hours  polyethylene glycol 3350 17 Gram(s) Oral two times a day  senna 2 Tablet(s) Oral at bedtime  timolol 0.5% Solution 1 Drop(s) Left EYE daily    MEDICATIONS  (PRN):  acetaminophen   Tablet .. 325 milliGRAM(s) Oral every 6 hours PRN Mild Pain (1 - 3)  HYDROmorphone   Tablet 1 milliGRAM(s) Oral every 4 hours PRN Moderate and severe pain      Objective:    Vitals: Vital Signs Last 24 Hrs  T(C): 36.8 (01-13-19 @ 06:27), Max: 37.1 (01-12-19 @ 21:37)  T(F): 98.3 (01-13-19 @ 06:27), Max: 98.8 (01-12-19 @ 21:37)  HR: 82 (01-13-19 @ 06:27) (60 - 93)  BP: 106/65 (01-13-19 @ 06:27) (87/47 - 119/70)  BP(mean): --  RR: 16 (01-13-19 @ 06:27) (16 - 16)  SpO2: 100% (01-13-19 @ 06:27) (100% - 100%)            I&O's Summary    12 Jan 2019 07:01  -  13 Jan 2019 07:00  --------------------------------------------------------  IN: 0 mL / OUT: 300 mL / NET: -300 mL    13 Jan 2019 07:01  -  13 Jan 2019 09:01  --------------------------------------------------------  IN: 0 mL / OUT: 100 mL / NET: -100 mL        PHYSICAL EXAM:  GENERAL: Elderly woman, resting in bed  HEAD:  Poor dentition  EYES: Conjunctiva and sclera clear  CHEST/LUNG: Clear to auscultation bilaterally; No rales, rhonchi, wheezing, or rubs  HEART: Regular rate and rhythm; No murmurs, rubs, or gallops.  ABDOMEN: Soft, Nontender, Nondistended, BS+   SKIN: Dry appearing  NERVOUS SYSTEM:  Alert & Oriented X0, unable to further assess due to patient mental status, follows some commands    Labs: no new labs

## 2019-01-13 NOTE — PROGRESS NOTE ADULT - PROBLEM SELECTOR PLAN 6
VTE: Heparin subcu q12h  GoC: pt awaiting home hospice, MOLST in chart, DNR/DNI  Dispo: home with hospice pending home equipment.

## 2019-01-13 NOTE — PROGRESS NOTE ADULT - PROBLEM SELECTOR PLAN 3
-recent stroke and evolving lesion seen on head CT with continued MS changes  MRI: subacute left parietal infarct, and few tiny scattered   acute infarcts in the bilateral centrum semiovale ovale.  -speech and swallow: diet is mechanical soft with thin liquids  -PT/OT recommending rehab however patient will go home to home hospice as per family preference, social work/ case management on board

## 2019-01-14 VITALS
HEART RATE: 78 BPM | OXYGEN SATURATION: 100 % | SYSTOLIC BLOOD PRESSURE: 117 MMHG | DIASTOLIC BLOOD PRESSURE: 70 MMHG | RESPIRATION RATE: 17 BRPM | TEMPERATURE: 98 F

## 2019-01-14 PROCEDURE — 99239 HOSP IP/OBS DSCHRG MGMT >30: CPT

## 2019-01-14 RX ORDER — ATORVASTATIN CALCIUM 80 MG/1
1 TABLET, FILM COATED ORAL
Qty: 30 | Refills: 0 | OUTPATIENT
Start: 2019-01-14 | End: 2019-02-12

## 2019-01-14 RX ORDER — DORZOLAMIDE HYDROCHLORIDE 20 MG/ML
1 SOLUTION/ DROPS OPHTHALMIC
Qty: 0 | Refills: 0 | COMMUNITY

## 2019-01-14 RX ORDER — ASPIRIN/CALCIUM CARB/MAGNESIUM 324 MG
1 TABLET ORAL
Qty: 30 | Refills: 0 | OUTPATIENT
Start: 2019-01-14 | End: 2019-02-12

## 2019-01-14 RX ORDER — METOPROLOL TARTRATE 50 MG
1 TABLET ORAL
Qty: 0 | Refills: 0 | COMMUNITY

## 2019-01-14 RX ORDER — TIMOLOL 0.5 %
1 DROPS OPHTHALMIC (EYE)
Qty: 0 | Refills: 0 | COMMUNITY

## 2019-01-14 RX ORDER — ATORVASTATIN CALCIUM 80 MG/1
1 TABLET, FILM COATED ORAL
Qty: 0 | Refills: 0 | COMMUNITY

## 2019-01-14 RX ORDER — DORZOLAMIDE HYDROCHLORIDE 20 MG/ML
1 SOLUTION/ DROPS OPHTHALMIC
Qty: 10 | Refills: 0 | OUTPATIENT
Start: 2019-01-14 | End: 2019-02-12

## 2019-01-14 RX ORDER — METOPROLOL TARTRATE 50 MG
1 TABLET ORAL
Qty: 30 | Refills: 0 | OUTPATIENT
Start: 2019-01-14 | End: 2019-02-12

## 2019-01-14 RX ORDER — POLYETHYLENE GLYCOL 3350 17 G/17G
17 POWDER, FOR SOLUTION ORAL
Qty: 510 | Refills: 0 | OUTPATIENT
Start: 2019-01-14 | End: 2019-02-12

## 2019-01-14 RX ORDER — HYDROMORPHONE HYDROCHLORIDE 2 MG/ML
0.5 INJECTION INTRAMUSCULAR; INTRAVENOUS; SUBCUTANEOUS
Qty: 30 | Refills: 0 | OUTPATIENT
Start: 2019-01-14 | End: 2019-01-23

## 2019-01-14 RX ORDER — SENNA PLUS 8.6 MG/1
2 TABLET ORAL
Qty: 60 | Refills: 0 | OUTPATIENT
Start: 2019-01-14 | End: 2019-02-12

## 2019-01-14 RX ORDER — ACETAMINOPHEN 500 MG
1 TABLET ORAL
Qty: 120 | Refills: 0 | OUTPATIENT
Start: 2019-01-14 | End: 2019-02-12

## 2019-01-14 RX ORDER — SENNA PLUS 8.6 MG/1
2 TABLET ORAL
Qty: 0 | Refills: 0 | COMMUNITY

## 2019-01-14 RX ORDER — BRIMONIDINE TARTRATE 2 MG/MG
1 SOLUTION/ DROPS OPHTHALMIC
Qty: 0 | Refills: 0 | COMMUNITY

## 2019-01-14 RX ORDER — TIMOLOL 0.5 %
1 DROPS OPHTHALMIC (EYE)
Qty: 10 | Refills: 0 | OUTPATIENT
Start: 2019-01-14 | End: 2019-02-12

## 2019-01-14 RX ORDER — FUROSEMIDE 40 MG
1 TABLET ORAL
Qty: 0 | Refills: 0 | COMMUNITY

## 2019-01-14 RX ORDER — ASPIRIN/CALCIUM CARB/MAGNESIUM 324 MG
1 TABLET ORAL
Qty: 0 | Refills: 0 | COMMUNITY

## 2019-01-14 RX ORDER — ACETAMINOPHEN 500 MG
2 TABLET ORAL
Qty: 0 | Refills: 0 | COMMUNITY

## 2019-01-14 RX ORDER — BRIMONIDINE TARTRATE 2 MG/MG
1 SOLUTION/ DROPS OPHTHALMIC
Qty: 10 | Refills: 0 | OUTPATIENT
Start: 2019-01-14 | End: 2019-02-12

## 2019-01-14 RX ORDER — DOCUSATE SODIUM 100 MG
1 CAPSULE ORAL
Qty: 90 | Refills: 0 | OUTPATIENT
Start: 2019-01-14 | End: 2019-02-12

## 2019-01-14 RX ORDER — FUROSEMIDE 40 MG
1 TABLET ORAL
Qty: 30 | Refills: 0 | OUTPATIENT
Start: 2019-01-14 | End: 2019-02-12

## 2019-01-14 RX ADMIN — Medication 100 MILLIGRAM(S): at 05:45

## 2019-01-14 RX ADMIN — DORZOLAMIDE HYDROCHLORIDE 1 DROP(S): 20 SOLUTION/ DROPS OPHTHALMIC at 13:21

## 2019-01-14 RX ADMIN — POLYETHYLENE GLYCOL 3350 17 GRAM(S): 17 POWDER, FOR SOLUTION ORAL at 05:45

## 2019-01-14 RX ADMIN — Medication 1 DROP(S): at 13:21

## 2019-01-14 RX ADMIN — BRIMONIDINE TARTRATE 1 DROP(S): 2 SOLUTION/ DROPS OPHTHALMIC at 13:21

## 2019-01-14 RX ADMIN — DORZOLAMIDE HYDROCHLORIDE 1 DROP(S): 20 SOLUTION/ DROPS OPHTHALMIC at 05:45

## 2019-01-14 RX ADMIN — HEPARIN SODIUM 5000 UNIT(S): 5000 INJECTION INTRAVENOUS; SUBCUTANEOUS at 05:45

## 2019-01-14 RX ADMIN — Medication 20 MILLIGRAM(S): at 05:45

## 2019-01-14 NOTE — GOALS OF CARE CONVERSATION - PERSONAL ADVANCE DIRECTIVE - CONVERSATION DETAILS
[facility] - Consents signed. Dtr requests equipment delivery on Monday due to the fact that pt's room needs to be cleaned. A hospital bed, commode,  w/c and O2 will be delivered from [facility] Monday AM
[facility] - Equipment has been delivered to the home. Hospice is ready to render care
Meeting held with pt's family to discuss pt's overall medical condition including summary of pt's illness, hospitalization, trajectory of illness, prognosis, advanced care planning and transition of care recommendations. Family are all understanding that pt's prognosis is poor at this time and understand that pt will not recover to her prior independent level of functioning. Family agree that pt would prefer to be home and want to focus on pt's quality of life. Family are all in agreement that pt would not want aggressive resuscitative such as CPR, mechanical ventilation or artificial nutrition at the end of life. Family are in agreement with DNR/DNI. MOLST completed and placed on chart. Family are also amenable to hospice care and are in agreement with referral to hospice care network. Referral made. Family have signed consents for home hospice and will take pt home pending DME delivery and medical clearance.

## 2019-01-14 NOTE — PROGRESS NOTE ADULT - PROVIDER SPECIALTY LIST ADULT
Internal Medicine
Neurology
Palliative Care
Internal Medicine
Internal Medicine

## 2019-01-14 NOTE — PROGRESS NOTE ADULT - PROBLEM SELECTOR PROBLEM 1
Encephalopathy, unspecified
Pain
Encephalopathy, unspecified

## 2019-01-14 NOTE — PROGRESS NOTE ADULT - ATTENDING COMMENTS
Patient seen and examined.      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  Family meeting on 1/10.  Family no longer interested in [facility], want to focus on comfort, interested in home hospice.  Being arranged, equipment delivered today, now pending transport home.     dispo planning initiated, GABBIE/ILANA aware  home with hospice today  dispo time 33 min

## 2019-01-14 NOTE — PROGRESS NOTE ADULT - SUBJECTIVE AND OBJECTIVE BOX
***************************************************************  Sharmila Bales, PGY1  Internal Medicine   pager [phone]  ***************************************************************    HONEY MOREL  85y  MRN: [id number]    Patient is a 85y old woman admitted for encephalopathy.     Subjective: Patient was seen and examined at the bedside. There were no acute events overnight. She is AAO x0.        MEDICATIONS  (STANDING):  aspirin enteric coated 81 milliGRAM(s) Oral daily  brimonidine 0.2% Ophthalmic Solution 1 Drop(s) Left EYE daily  docusate sodium 100 milliGRAM(s) Oral three times a day  dorzolamide 2% Ophthalmic Solution 1 Drop(s) Left EYE three times a day  furosemide    Tablet 20 milliGRAM(s) Oral daily  heparin  Injectable 5000 Unit(s) SubCutaneous every 12 hours  polyethylene glycol 3350 17 Gram(s) Oral two times a day  senna 2 Tablet(s) Oral at bedtime  timolol 0.5% Solution 1 Drop(s) Left EYE daily    MEDICATIONS  (PRN):  acetaminophen   Tablet .. 325 milliGRAM(s) Oral every 6 hours PRN Mild Pain (1 - 3)  HYDROmorphone   Tablet 1 milliGRAM(s) Oral every 4 hours PRN Moderate and severe pain      Objective:    Vitals: Vital Signs Last 24 Hrs  T(C): 36.5 (01-14-19 @ 05:43), Max: 37.4 (01-13-19 @ 21:52)  T(F): 97.7 (01-14-19 @ 05:43), Max: 99.3 (01-13-19 @ 21:52)  HR: 75 (01-14-19 @ 05:43) (75 - 95)  BP: 118/73 (01-14-19 @ 05:43) (99/56 - 118/73)  BP(mean): --  RR: 16 (01-14-19 @ 05:43) (16 - 16)  SpO2: 100% (01-14-19 @ 05:43) (100% - 100%)            I&O's Summary    13 Jan 2019 07:01  -  14 Jan 2019 07:00  --------------------------------------------------------  IN: 0 mL / OUT: 300 mL / NET: -300 mL        PHYSICAL EXAM:  GENERAL: Elderly woman, resting in bed  HEAD:  Poor dentition  EYES: Conjunctiva and sclera clear  CHEST/LUNG: Clear to auscultation bilaterally; No rales, rhonchi, wheezing, or rubs  HEART: Regular rate and rhythm; No murmurs, rubs, or gallops.  ABDOMEN: Soft, Nontender, Nondistended, BS+   SKIN: Dry appearing  NERVOUS SYSTEM:  Alert & Oriented X0, unable to further assess due to patient mental status    Labs: no new labs                              CAPILLARY BLOOD GLUCOSE          RADIOLOGY & ADDITIONAL TESTS:    Imaging Personally Reviewed:  [x ] YES  [ ] NO    Consultants involved in case:   Consultant(s) Notes Reviewed:  [ x] YES  [ ] NO:   Care Discussed with Consultants/Other Providers [x ] YES  [ ] NO ***************************************************************  Sharmila Bales, PGY1  Internal Medicine   pager [phone]  ***************************************************************    HONEY MOREL  85y  MRN: [id number]    Patient is a 85y old woman admitted for encephalopathy.     Subjective: Patient was seen and examined at the bedside. There were no acute events overnight. She is AAO x0.      MEDICATIONS  (STANDING):  aspirin enteric coated 81 milliGRAM(s) Oral daily  brimonidine 0.2% Ophthalmic Solution 1 Drop(s) Left EYE daily  docusate sodium 100 milliGRAM(s) Oral three times a day  dorzolamide 2% Ophthalmic Solution 1 Drop(s) Left EYE three times a day  furosemide    Tablet 20 milliGRAM(s) Oral daily  heparin  Injectable 5000 Unit(s) SubCutaneous every 12 hours  polyethylene glycol 3350 17 Gram(s) Oral two times a day  senna 2 Tablet(s) Oral at bedtime  timolol 0.5% Solution 1 Drop(s) Left EYE daily    MEDICATIONS  (PRN):  acetaminophen   Tablet .. 325 milliGRAM(s) Oral every 6 hours PRN Mild Pain (1 - 3)  HYDROmorphone   Tablet 1 milliGRAM(s) Oral every 4 hours PRN Moderate and severe pain    Objective:    Vitals: Vital Signs Last 24 Hrs  T(C): 36.5 (01-14-19 @ 05:43), Max: 37.4 (01-13-19 @ 21:52)  T(F): 97.7 (01-14-19 @ 05:43), Max: 99.3 (01-13-19 @ 21:52)  HR: 75 (01-14-19 @ 05:43) (75 - 95)  BP: 118/73 (01-14-19 @ 05:43) (99/56 - 118/73)  BP(mean): --  RR: 16 (01-14-19 @ 05:43) (16 - 16)  SpO2: 100% (01-14-19 @ 05:43) (100% - 100%)            I&O's Summary    13 Jan 2019 07:01  -  14 Jan 2019 07:00  --------------------------------------------------------  IN: 0 mL / OUT: 300 mL / NET: -300 mL    PHYSICAL EXAM:  GENERAL: Elderly woman, resting in bed  HEAD:  Poor dentition  EYES: Conjunctiva and sclera clear  CHEST/LUNG: Clear to auscultation bilaterally; No rales, rhonchi, wheezing, or rubs  HEART: Regular rate and rhythm; No murmurs, rubs, or gallops.  ABDOMEN: Soft, Nontender, Nondistended, BS+   SKIN: Dry appearing  NERVOUS SYSTEM:  Alert & Oriented X0, unable to further assess due to patient mental status    Labs: no new labs      RADIOLOGY & ADDITIONAL TESTS:    Imaging Personally Reviewed:  [x ] YES  [ ] NO    Consultants involved in case:   Consultant(s) Notes Reviewed:  [ x] YES  [ ] NO:   Care Discussed with Consultants/Other Providers [x ] YES  [ ] NO

## 2019-01-14 NOTE — PROGRESS NOTE ADULT - PROBLEM SELECTOR PROBLEM 3
CVA (cerebral vascular accident)
Debility

## 2019-01-14 NOTE — PROGRESS NOTE ADULT - PROBLEM SELECTOR PROBLEM 2
Acute on chronic systolic heart failure
CVA (cerebral vascular accident)

## 2019-01-14 NOTE — PROGRESS NOTE ADULT - PROBLEM SELECTOR PROBLEM 5
Elevated liver enzymes
Elevated liver enzymes
Advance care planning
Elevated liver enzymes

## 2019-01-14 NOTE — PROGRESS NOTE ADULT - REASON FOR ADMISSION
encephalopathy

## 2019-01-15 LAB
GAS PNL BLDMV: SIGNIFICANT CHANGE UP
GAS PNL BLDMV: SIGNIFICANT CHANGE UP

## 2019-01-24 LAB
KAPPA/LAMBDA FREE LIGHT CHAIN RATIO, SERUM: 1.59 — SIGNIFICANT CHANGE UP
L PNEUMO AG UR QL: NEGATIVE — SIGNIFICANT CHANGE UP

## 2019-04-05 NOTE — DISCHARGE NOTE ADULT - NSSTROKEREHABRD_NEU_A_CORE
"Rehab Progress Note     Date of Service: 4/5/2019  Chief Complaint: follow up debility    Interval Events (Subjective)    Patient seen and examined in her room today.  She has no new complaints.  She is about ready to do her speech therapy.  Both her physical therapist and her speech therapist report that she has made great improvements in her mobility as well as her functional sequencing.  Patient denies any pain.  Ports she would like to get home as soon as possible so that she can be with her .    Objective:  VITAL SIGNS: /76   Pulse 73   Temp 36.4 °C (97.6 °F) (Oral)   Resp 16   Ht 1.702 m (5' 7\")   Wt 69.3 kg (152 lb 12.5 oz)   SpO2 98%   Breastfeeding? No   BMI 23.93 kg/m²   Gen: alert, no apparent distress  CV: regular rate and rhythm, no murmurs, no peripheral edema  Resp: clear to ascultation bilaterally, normal respiratory effort  GI: soft, non-tender abdomen, bowel sounds present  Neuro: notable for hyerpverbose    No results found for this or any previous visit (from the past 72 hour(s)).    Current Facility-Administered Medications   Medication Frequency   • propranolol LA (INDERAL LA) capsule 120 mg Q DAY   • vitamin D (cholecalciferol) tablet 1,000 Units DAILY   • artificial tears 1.4 % ophthalmic solution 1 Drop PRN   • benzocaine-menthol (CEPACOL) lozenge 1 Lozenge Q2HRS PRN   • enoxaparin (LOVENOX) inj 40 mg QHS   • mag hydrox-al hydrox-simeth (MAALOX PLUS ES or MYLANTA DS) suspension 20 mL Q2HRS PRN   • ondansetron (ZOFRAN ODT) dispertab 4 mg 4X/DAY PRN    Or   • ondansetron (ZOFRAN) syringe/vial injection 4 mg 4X/DAY PRN   • sodium chloride (OCEAN) 0.65 % nasal spray 2 Spray PRN   • traZODone (DESYREL) tablet 25 mg QHS PRN   • Pharmacy Consult Request ...Pain Management Review 1 Each PHARMACY TO DOSE   • Respiratory Care per Protocol Continuous RT   • melatonin tablet 3 mg HS PRN   • senna-docusate (PERICOLACE or SENOKOT S) 8.6-50 MG per tablet 2 Tab BID    And   • " polyethylene glycol/lytes (MIRALAX) PACKET 1 Packet QDAY PRN    And   • magnesium hydroxide (MILK OF MAGNESIA) suspension 30 mL QDAY PRN    And   • bisacodyl (DULCOLAX) suppository 10 mg QDAY PRN   • acetaminophen (TYLENOL) tablet 650 mg Q6HRS PRN   • DULoxetine (CYMBALTA) capsule 60 mg DAILY   • omeprazole (PRILOSEC) capsule 40 mg DAILY   • pramipexole (MIRAPEX) tablet 0.5 mg QHS       Orders Placed This Encounter   Procedures   • Diet Order Regular     Standing Status:   Standing     Number of Occurrences:   1     Order Specific Question:   Diet:     Answer:   Regular [1]       Assessment:  Active Hospital Problems    Diagnosis   • *Debility   • Falls   • Restless legs syndrome   • Chronic back pain   • Mood disorder (HCC)   • GERD (gastroesophageal reflux disease)     This patient is a 80 y.o. female admitted for acute inpatient rehabilitation with Debility.    I led and attended the weekly conference, and agree with the IDT conference documentation and plan of care as noted below.    Date of conference: 4/3/2019    Goals:    1) ambulate 100 ft with FWW min assist  2) curb with min assist and FWW  3) SBA bathing  4) SBA US dressing  5) use external memory aides  6) improved attention    Met 0/4 PT goals, met 0/7 OT goals, met 0/3 goals - unable to meet goals due to her hyperverbosity/decreased attention to task    Barriers:    1) poor attention  2) hard of hearing  3) impaired carryover of learning  4) hyper verbosity  5) poor insight/denial of deficits  6) moderate to severe cognitive deficits    Admission FIM 63 --> 76 (4/3)    CM/social support: Plan to discharge home to supportive care of spouse and family. Patient lives with her  in [facility], with 1 step to enter, in [place]. Supportive daughter lives in [place]. Patient reports she has two grandchildren who are also very supportive.     Anticipated DC date: 4/11/2019    Home health: PT/OT/SLP/RN    Equip: TBD    Follow up: PCP      Medical Decision  Making and Plan:    Debility  Cognitive Deficits, moderate to severe, improving  Hyper verbose, impaired attention, improving  Continue full rehab program  PT/OT/SLP, 1 hr each discipline, 5 days per week    Chronic back pain, stable  PRN tylenol  Previously on baclofen which caused altered mental status     Frequent falls  Poor balance  Therapy evaluation for LRD  Per therapy, due to poor attention     Peripheral neuropathy  Continue Cymbalta     Hypertension  Orthostatic hypotension  Continue propranolol --> dose decreased to 120 mg   SBPs in last 24 hrs 100-131  Continue to monitor     History of depression  Continue Cymbalta     GERD  Continue Prilosec     Restless legs syndrome  Continue Requip    Hypokalemia, resolved  Discontinued supplementation    Bowel  Continue bowel meds  Last BM 4/4    DVT prophylaxis  Lovenox    Total time:  16 minutes.  I spent greater than 50% of the time for patient care, counseling, and coordination on this date, including patient face-to face time, unit/floor time with review of records/pertinent lab data and studies, as well as discussing diagnostic evaluation/work up, planned therapeutic interventions, and future disposition of care, as per the interval events/subjective and the assessment and plan as noted above.    I have performed a physical exam, reviewed and updated ROS, as well as the assessment and plan today 4/5/2019. In review of note from 4/4/2019 there are no new changes except as documented above.      Marleen Fontaine M.D.   Physical Medicine and Rehabilitation         patient/family refused